# Patient Record
Sex: MALE | Race: WHITE | NOT HISPANIC OR LATINO | URBAN - METROPOLITAN AREA
[De-identification: names, ages, dates, MRNs, and addresses within clinical notes are randomized per-mention and may not be internally consistent; named-entity substitution may affect disease eponyms.]

---

## 2018-03-28 ENCOUNTER — INPATIENT (INPATIENT)
Facility: HOSPITAL | Age: 78
LOS: 1 days | Discharge: HOME | DRG: 378 | End: 2018-03-30
Attending: STUDENT IN AN ORGANIZED HEALTH CARE EDUCATION/TRAINING PROGRAM | Admitting: STUDENT IN AN ORGANIZED HEALTH CARE EDUCATION/TRAINING PROGRAM
Payer: MEDICARE

## 2018-03-28 VITALS
RESPIRATION RATE: 19 BRPM | SYSTOLIC BLOOD PRESSURE: 145 MMHG | HEART RATE: 82 BPM | OXYGEN SATURATION: 100 % | TEMPERATURE: 99 F | DIASTOLIC BLOOD PRESSURE: 78 MMHG

## 2018-03-28 LAB
ALBUMIN SERPL ELPH-MCNC: 3.2 G/DL — LOW (ref 3.4–5)
ALP SERPL-CCNC: 82 U/L — SIGNIFICANT CHANGE UP (ref 40–120)
ALT FLD-CCNC: 191 U/L — HIGH (ref 12–42)
ANION GAP SERPL CALC-SCNC: 11 MMOL/L — SIGNIFICANT CHANGE UP (ref 9–16)
APTT BLD: 73.4 SEC — HIGH (ref 27.5–36.5)
AST SERPL-CCNC: 84 U/L — HIGH (ref 15–37)
BASOPHILS NFR BLD AUTO: 0.1 % — SIGNIFICANT CHANGE UP (ref 0–2)
BILIRUB SERPL-MCNC: 0.5 MG/DL — SIGNIFICANT CHANGE UP (ref 0.2–1.2)
BUN SERPL-MCNC: 53 MG/DL — HIGH (ref 7–23)
CALCIUM SERPL-MCNC: 8.6 MG/DL — SIGNIFICANT CHANGE UP (ref 8.5–10.5)
CHLORIDE SERPL-SCNC: 102 MMOL/L — SIGNIFICANT CHANGE UP (ref 96–108)
CO2 SERPL-SCNC: 25 MMOL/L — SIGNIFICANT CHANGE UP (ref 22–31)
CREAT SERPL-MCNC: 1.48 MG/DL — HIGH (ref 0.5–1.3)
CRP SERPL-MCNC: <0.9 MG/DL — SIGNIFICANT CHANGE UP (ref 0–0.9)
EOSINOPHIL NFR BLD AUTO: 0.1 % — SIGNIFICANT CHANGE UP (ref 0–6)
GLUCOSE SERPL-MCNC: 141 MG/DL — HIGH (ref 70–99)
HCT VFR BLD CALC: 30.5 % — LOW (ref 39–50)
HGB BLD-MCNC: 9.8 G/DL — LOW (ref 13–17)
IMM GRANULOCYTES NFR BLD AUTO: 0.5 % — SIGNIFICANT CHANGE UP (ref 0–1.5)
INR BLD: 13.75 — CRITICAL HIGH (ref 0.88–1.16)
LACTATE SERPL-SCNC: 3.8 MMOL/L — HIGH (ref 0.4–2)
LYMPHOCYTES # BLD AUTO: 6.3 % — LOW (ref 13–44)
MAGNESIUM SERPL-MCNC: 2 MG/DL — SIGNIFICANT CHANGE UP (ref 1.6–2.6)
MCHC RBC-ENTMCNC: 27.1 PG — SIGNIFICANT CHANGE UP (ref 27–34)
MCHC RBC-ENTMCNC: 32.1 G/DL — SIGNIFICANT CHANGE UP (ref 32–36)
MCV RBC AUTO: 84.5 FL — SIGNIFICANT CHANGE UP (ref 80–100)
MONOCYTES NFR BLD AUTO: 8 % — SIGNIFICANT CHANGE UP (ref 2–14)
NEUTROPHILS NFR BLD AUTO: 85 % — HIGH (ref 43–77)
NT-PROBNP SERPL-SCNC: 2747 PG/ML — HIGH
OB PNL STL: POSITIVE
PLATELET # BLD AUTO: 196 K/UL — SIGNIFICANT CHANGE UP (ref 150–400)
POTASSIUM SERPL-MCNC: 3.9 MMOL/L — SIGNIFICANT CHANGE UP (ref 3.5–5.3)
POTASSIUM SERPL-SCNC: 3.9 MMOL/L — SIGNIFICANT CHANGE UP (ref 3.5–5.3)
PROT SERPL-MCNC: 5.9 G/DL — LOW (ref 6.4–8.2)
PROTHROM AB SERPL-ACNC: 159.6 SEC — HIGH (ref 9.8–12.7)
RBC # BLD: 3.61 M/UL — LOW (ref 4.2–5.8)
RBC # FLD: 19.9 % — HIGH (ref 10.3–16.9)
SODIUM SERPL-SCNC: 138 MMOL/L — SIGNIFICANT CHANGE UP (ref 132–145)
TROPONIN I SERPL-MCNC: 0.02 NG/ML — SIGNIFICANT CHANGE UP (ref 0.02–0.06)
WBC # BLD: 15.9 K/UL — HIGH (ref 3.8–10.5)
WBC # FLD AUTO: 15.9 K/UL — HIGH (ref 3.8–10.5)

## 2018-03-28 PROCEDURE — 71045 X-RAY EXAM CHEST 1 VIEW: CPT | Mod: 26

## 2018-03-28 PROCEDURE — 93010 ELECTROCARDIOGRAM REPORT: CPT

## 2018-03-28 PROCEDURE — 99223 1ST HOSP IP/OBS HIGH 75: CPT | Mod: GC

## 2018-03-28 PROCEDURE — 99291 CRITICAL CARE FIRST HOUR: CPT

## 2018-03-28 RX ORDER — SODIUM CHLORIDE 9 MG/ML
1000 INJECTION INTRAMUSCULAR; INTRAVENOUS; SUBCUTANEOUS ONCE
Qty: 0 | Refills: 0 | Status: COMPLETED | OUTPATIENT
Start: 2018-03-28 | End: 2018-03-28

## 2018-03-28 RX ORDER — PANTOPRAZOLE SODIUM 20 MG/1
8 TABLET, DELAYED RELEASE ORAL
Qty: 80 | Refills: 0 | Status: DISCONTINUED | OUTPATIENT
Start: 2018-03-28 | End: 2018-03-29

## 2018-03-28 RX ORDER — PROTHROMBIN COMPLEX CONCENTRATE (HUMAN) 25.5; 16.5; 24; 22; 22; 26 [IU]/ML; [IU]/ML; [IU]/ML; [IU]/ML; [IU]/ML; [IU]/ML
1500 POWDER, FOR SOLUTION INTRAVENOUS ONCE
Qty: 0 | Refills: 0 | Status: COMPLETED | OUTPATIENT
Start: 2018-03-28 | End: 2018-03-28

## 2018-03-28 RX ORDER — PHYTONADIONE (VIT K1) 5 MG
5 TABLET ORAL ONCE
Qty: 0 | Refills: 0 | Status: COMPLETED | OUTPATIENT
Start: 2018-03-28 | End: 2018-03-28

## 2018-03-28 RX ORDER — PANTOPRAZOLE SODIUM 20 MG/1
80 TABLET, DELAYED RELEASE ORAL ONCE
Qty: 0 | Refills: 0 | Status: COMPLETED | OUTPATIENT
Start: 2018-03-28 | End: 2018-03-28

## 2018-03-28 RX ADMIN — Medication 101 MILLIGRAM(S): at 23:22

## 2018-03-28 RX ADMIN — PANTOPRAZOLE SODIUM 80 MILLIGRAM(S): 20 TABLET, DELAYED RELEASE ORAL at 21:51

## 2018-03-28 RX ADMIN — Medication 5 MILLIGRAM(S): at 22:52

## 2018-03-28 RX ADMIN — SODIUM CHLORIDE 1000 MILLILITER(S): 9 INJECTION INTRAMUSCULAR; INTRAVENOUS; SUBCUTANEOUS at 21:30

## 2018-03-28 RX ADMIN — PANTOPRAZOLE SODIUM 10 MG/HR: 20 TABLET, DELAYED RELEASE ORAL at 21:55

## 2018-03-28 RX ADMIN — PROTHROMBIN COMPLEX CONCENTRATE (HUMAN) 400 INTERNATIONAL UNIT(S): 25.5; 16.5; 24; 22; 22; 26 POWDER, FOR SOLUTION INTRAVENOUS at 23:57

## 2018-03-28 RX ADMIN — SODIUM CHLORIDE 1000 MILLILITER(S): 9 INJECTION INTRAMUSCULAR; INTRAVENOUS; SUBCUTANEOUS at 22:32

## 2018-03-28 NOTE — ED PROVIDER NOTE - SKIN, MLM
Somewhat pale, some skin excorations (from prednisone), feet soles are dusky b/l with delayed cap refill.

## 2018-03-28 NOTE — ED PROVIDER NOTE - PROGRESS NOTE DETAILS
Spoke with Dr. Polo who agrees with Admission to [facility]. Patient accepted to MICU by Dr. Grossman (for DR. Cruz). Initially given Vit K 5 PO with plan for FFP. Dr. Ramirez preferred Vit K 5 IV additionally and KCentra given severely elevated INR. Patient remains HD stable and is agreeable to go to [facility]. Wife at bedside. Laccate 3.8. Protonix GGT started. Spoke with Dr. Polo who agrees with Admission to [facility]. Patient accepted to MICU by Dr. Grossman (for DR. Cruz). Initially given Vit K 5 PO with plan for FFP. Dr. Ramirez preferred Vit K 5 IV additionally and KCentra given severely elevated INR. Patient remains HD stable and is agreeable to go to [facility]. Wife at bedside. Laccate 3.8. Protonix GGT started.    Dr. Polo would like him to get at least 1 U pRBCs given his recent cardiac illness and sx.

## 2018-03-28 NOTE — ED PROVIDER NOTE - DIAGNOSTIC INTERPRETATION
Interpreted by ED Physician:  CXR (1 view): no acute abnormality: no infiltrates, bones appear intact, cardiac silhouette is enlarged

## 2018-03-28 NOTE — ED PROVIDER NOTE - PMH
Acute pericarditis, unspecified type    Chronic atrial fibrillation    Gout, unspecified cause, unspecified chronicity, unspecified site

## 2018-03-28 NOTE — ED PROVIDER NOTE - GASTROINTESTINAL, MLM
Abdomen soft, non-tender, no guarding. RECTAL: + dried small amt of blood at perianal area, no grods blood on PRINCE, no ext hemorrhoid, non tender PRINCE

## 2018-03-28 NOTE — ED PROVIDER NOTE - CRITICAL CARE PROVIDED
additional history taking/documentation/consult w/ pt's family directly relating to pts condition/direct patient care (not related to procedure)/interpretation of diagnostic studies/consultation with other physicians

## 2018-03-28 NOTE — ED PROVIDER NOTE - MEDICAL DECISION MAKING DETAILS
Patient presenting with sx most consistent with UGIB on Coumadin with elevated INR at 14.6 in [facility]. Will check labs with lactate and trop (given recent pericarditis and dusky feet). No other sighs of shock. HD stable. Anticipate admission.

## 2018-03-28 NOTE — ED PROVIDER NOTE - OBJECTIVE STATEMENT
77 M here with INR 14.6 and black tarry stools /diarrhea x 2 weeks and some scant bleeding from hemorrhoids with wiping today (no blood in blow, just on TP). Wife also noted some dried blood at nares after dinner tonight (but no nosebleed).  No chest pain. + SOBOE/fatigued. Vague mild abdo discomfort x 2 weeks. Skin at feet is cracked and dry in winter a per usual but has been bleeding a little this week. No other bleeding.      He had routine labs Monday and INR was 14.4. Sent to [facility] today and it was 14.6 there. Sent to ED. Cardiologist ii at [facility] and PMD is Dr. Curly Polo [phone] (from ).     He has a long hx of AF on Coumadin x 22yrs. Normally has INT 2-3 on stable dos of 4mg.   Also has past heavy EtOH use.    Over the past 6 mos has had more SOBOE/fatigue. Worse x 6 weeks. He was ultimately dx'd with an inflammatory pericarditis with small pleural and pericardial effusions and has been on Prednisone 40mg and Colchicine 0.6 for the past 2 weeks. He thought that the black stools and diarrhea were side effects of colchicine. Over the past few days has been more fatigued/wiped out. ESR was in 70's and CRP close to 200.     He and his wife say that his feet are normally purpale/dusky. Never had PVD eval or ewas told that he had delayed cap refill.

## 2018-03-28 NOTE — ED ADULT TRIAGE NOTE - CHIEF COMPLAINT QUOTE
Pt. sent from PMD for an INR of 14.4, pt. currently on coumadin. Pt. states he has bleeding hemorrhoids. Pt. presents pale on arrival.

## 2018-03-29 DIAGNOSIS — F32.9 MAJOR DEPRESSIVE DISORDER, SINGLE EPISODE, UNSPECIFIED: ICD-10-CM

## 2018-03-29 DIAGNOSIS — I30.9 ACUTE PERICARDITIS, UNSPECIFIED: ICD-10-CM

## 2018-03-29 DIAGNOSIS — I48.2 CHRONIC ATRIAL FIBRILLATION: ICD-10-CM

## 2018-03-29 DIAGNOSIS — K92.1 MELENA: ICD-10-CM

## 2018-03-29 DIAGNOSIS — N18.9 CHRONIC KIDNEY DISEASE, UNSPECIFIED: ICD-10-CM

## 2018-03-29 DIAGNOSIS — R79.1 ABNORMAL COAGULATION PROFILE: ICD-10-CM

## 2018-03-29 DIAGNOSIS — R74.0 NONSPECIFIC ELEVATION OF LEVELS OF TRANSAMINASE AND LACTIC ACID DEHYDROGENASE [LDH]: ICD-10-CM

## 2018-03-29 DIAGNOSIS — Z29.9 ENCOUNTER FOR PROPHYLACTIC MEASURES, UNSPECIFIED: ICD-10-CM

## 2018-03-29 DIAGNOSIS — R63.8 OTHER SYMPTOMS AND SIGNS CONCERNING FOOD AND FLUID INTAKE: ICD-10-CM

## 2018-03-29 DIAGNOSIS — D50.0 IRON DEFICIENCY ANEMIA SECONDARY TO BLOOD LOSS (CHRONIC): ICD-10-CM

## 2018-03-29 LAB
ALBUMIN SERPL ELPH-MCNC: 3.1 G/DL — LOW (ref 3.3–5)
ALP SERPL-CCNC: 58 U/L — SIGNIFICANT CHANGE UP (ref 40–120)
ALT FLD-CCNC: 134 U/L — HIGH (ref 10–45)
ANION GAP SERPL CALC-SCNC: 11 MMOL/L — SIGNIFICANT CHANGE UP (ref 5–17)
APPEARANCE UR: CLEAR — SIGNIFICANT CHANGE UP
APTT BLD: 34.7 SEC — SIGNIFICANT CHANGE UP (ref 27.5–37.4)
AST SERPL-CCNC: 72 U/L — HIGH (ref 10–40)
BILIRUB SERPL-MCNC: 0.4 MG/DL — SIGNIFICANT CHANGE UP (ref 0.2–1.2)
BILIRUB UR-MCNC: NEGATIVE — SIGNIFICANT CHANGE UP
BLD GP AB SCN SERPL QL: NEGATIVE — SIGNIFICANT CHANGE UP
BUN SERPL-MCNC: 41 MG/DL — HIGH (ref 7–23)
CALCIUM SERPL-MCNC: 7.7 MG/DL — LOW (ref 8.4–10.5)
CHLORIDE SERPL-SCNC: 101 MMOL/L — SIGNIFICANT CHANGE UP (ref 96–108)
CO2 SERPL-SCNC: 23 MMOL/L — SIGNIFICANT CHANGE UP (ref 22–31)
COLOR SPEC: YELLOW — SIGNIFICANT CHANGE UP
CREAT SERPL-MCNC: 1.07 MG/DL — SIGNIFICANT CHANGE UP (ref 0.5–1.3)
DIFF PNL FLD: NEGATIVE — SIGNIFICANT CHANGE UP
EOSINOPHIL NFR BLD AUTO: 0.1 % — SIGNIFICANT CHANGE UP (ref 0–6)
GLUCOSE SERPL-MCNC: 108 MG/DL — HIGH (ref 70–99)
GLUCOSE UR QL: NEGATIVE — SIGNIFICANT CHANGE UP
HCT VFR BLD CALC: 25 % — LOW (ref 39–50)
HCT VFR BLD CALC: 25.9 % — LOW (ref 39–50)
HCT VFR BLD CALC: 27.4 % — LOW (ref 39–50)
HCT VFR BLD CALC: 28.5 % — LOW (ref 39–50)
HGB BLD-MCNC: 7.9 G/DL — LOW (ref 13–17)
HGB BLD-MCNC: 8 G/DL — LOW (ref 13–17)
HGB BLD-MCNC: 8.6 G/DL — LOW (ref 13–17)
HGB BLD-MCNC: 9 G/DL — LOW (ref 13–17)
INR BLD: 1.76 — HIGH (ref 0.88–1.16)
INR BLD: 1.82 — HIGH (ref 0.88–1.16)
INR BLD: 1.84 — HIGH (ref 0.88–1.16)
KETONES UR-MCNC: NEGATIVE — SIGNIFICANT CHANGE UP
LACTATE SERPL-SCNC: 2.2 MMOL/L — HIGH (ref 0.5–2)
LACTATE SERPL-SCNC: 2.4 MMOL/L — HIGH (ref 0.4–2)
LEUKOCYTE ESTERASE UR-ACNC: NEGATIVE — SIGNIFICANT CHANGE UP
LIDOCAIN IGE QN: 95 U/L — HIGH (ref 7–60)
LYMPHOCYTES # BLD AUTO: 2.9 % — LOW (ref 13–44)
MCHC RBC-ENTMCNC: 26.1 PG — LOW (ref 27–34)
MCHC RBC-ENTMCNC: 26.3 PG — LOW (ref 27–34)
MCHC RBC-ENTMCNC: 26.7 PG — LOW (ref 27–34)
MCHC RBC-ENTMCNC: 26.7 PG — LOW (ref 27–34)
MCHC RBC-ENTMCNC: 30.9 G/DL — LOW (ref 32–36)
MCHC RBC-ENTMCNC: 31.4 G/DL — LOW (ref 32–36)
MCHC RBC-ENTMCNC: 31.6 G/DL — LOW (ref 32–36)
MCHC RBC-ENTMCNC: 31.6 G/DL — LOW (ref 32–36)
MCV RBC AUTO: 83.8 FL — SIGNIFICANT CHANGE UP (ref 80–100)
MCV RBC AUTO: 84.4 FL — SIGNIFICANT CHANGE UP (ref 80–100)
MCV RBC AUTO: 84.5 FL — SIGNIFICANT CHANGE UP (ref 80–100)
MCV RBC AUTO: 84.6 FL — SIGNIFICANT CHANGE UP (ref 80–100)
MONOCYTES NFR BLD AUTO: 4.7 % — SIGNIFICANT CHANGE UP (ref 2–14)
NEUTROPHILS NFR BLD AUTO: 92.3 % — HIGH (ref 43–77)
NITRITE UR-MCNC: NEGATIVE — SIGNIFICANT CHANGE UP
PH UR: 5.5 — SIGNIFICANT CHANGE UP (ref 5–8)
PLATELET # BLD AUTO: 130 K/UL — LOW (ref 150–400)
PLATELET # BLD AUTO: 143 K/UL — LOW (ref 150–400)
PLATELET # BLD AUTO: 144 K/UL — LOW (ref 150–400)
PLATELET # BLD AUTO: 145 K/UL — LOW (ref 150–400)
POTASSIUM SERPL-MCNC: 4.2 MMOL/L — SIGNIFICANT CHANGE UP (ref 3.5–5.3)
POTASSIUM SERPL-SCNC: 4.2 MMOL/L — SIGNIFICANT CHANGE UP (ref 3.5–5.3)
PROCALCITONIN SERPL-MCNC: <0.05 NG/ML — SIGNIFICANT CHANGE UP (ref 0–0.04)
PROT SERPL-MCNC: 4.8 G/DL — LOW (ref 6–8.3)
PROT UR-MCNC: NEGATIVE MG/DL — SIGNIFICANT CHANGE UP
PROTHROM AB SERPL-ACNC: 19.6 SEC — HIGH (ref 9.8–12.7)
PROTHROM AB SERPL-ACNC: 20.5 SEC — HIGH (ref 9.8–12.7)
PROTHROM AB SERPL-ACNC: 20.7 SEC — HIGH (ref 9.8–12.7)
RBC # BLD: 2.96 M/UL — LOW (ref 4.2–5.8)
RBC # BLD: 3.07 M/UL — LOW (ref 4.2–5.8)
RBC # BLD: 3.27 M/UL — LOW (ref 4.2–5.8)
RBC # BLD: 3.37 M/UL — LOW (ref 4.2–5.8)
RBC # FLD: 19.8 % — HIGH (ref 10.3–16.9)
RBC # FLD: 20.1 % — HIGH (ref 10.3–16.9)
RBC # FLD: 20.1 % — HIGH (ref 10.3–16.9)
RBC # FLD: 20.3 % — HIGH (ref 10.3–16.9)
RH IG SCN BLD-IMP: POSITIVE — SIGNIFICANT CHANGE UP
RH IG SCN BLD-IMP: POSITIVE — SIGNIFICANT CHANGE UP
SODIUM SERPL-SCNC: 135 MMOL/L — SIGNIFICANT CHANGE UP (ref 135–145)
SP GR SPEC: 1.02 — SIGNIFICANT CHANGE UP (ref 1–1.03)
UROBILINOGEN FLD QL: 0.2 E.U./DL — SIGNIFICANT CHANGE UP
WBC # BLD: 11 K/UL — HIGH (ref 3.8–10.5)
WBC # BLD: 11.4 K/UL — HIGH (ref 3.8–10.5)
WBC # BLD: 14.4 K/UL — HIGH (ref 3.8–10.5)
WBC # BLD: 15 K/UL — HIGH (ref 3.8–10.5)
WBC # FLD AUTO: 11 K/UL — HIGH (ref 3.8–10.5)
WBC # FLD AUTO: 11.4 K/UL — HIGH (ref 3.8–10.5)
WBC # FLD AUTO: 14.4 K/UL — HIGH (ref 3.8–10.5)
WBC # FLD AUTO: 15 K/UL — HIGH (ref 3.8–10.5)

## 2018-03-29 PROCEDURE — 93010 ELECTROCARDIOGRAM REPORT: CPT

## 2018-03-29 PROCEDURE — 71045 X-RAY EXAM CHEST 1 VIEW: CPT | Mod: 26

## 2018-03-29 PROCEDURE — 99233 SBSQ HOSP IP/OBS HIGH 50: CPT | Mod: GC

## 2018-03-29 RX ORDER — DULOXETINE HYDROCHLORIDE 30 MG/1
1 CAPSULE, DELAYED RELEASE ORAL
Qty: 0 | Refills: 0 | COMMUNITY

## 2018-03-29 RX ORDER — PANTOPRAZOLE SODIUM 20 MG/1
8 TABLET, DELAYED RELEASE ORAL
Qty: 80 | Refills: 0 | Status: DISCONTINUED | OUTPATIENT
Start: 2018-03-29 | End: 2018-03-29

## 2018-03-29 RX ORDER — METOPROLOL TARTRATE 50 MG
1 TABLET ORAL
Qty: 0 | Refills: 0 | COMMUNITY

## 2018-03-29 RX ORDER — ALLOPURINOL 300 MG
100 TABLET ORAL THREE TIMES A DAY
Qty: 0 | Refills: 0 | Status: DISCONTINUED | OUTPATIENT
Start: 2018-03-29 | End: 2018-03-30

## 2018-03-29 RX ORDER — ALLOPURINOL 300 MG
1 TABLET ORAL
Qty: 0 | Refills: 0 | COMMUNITY

## 2018-03-29 RX ORDER — METOPROLOL TARTRATE 50 MG
50 TABLET ORAL DAILY
Qty: 0 | Refills: 0 | Status: DISCONTINUED | OUTPATIENT
Start: 2018-03-29 | End: 2018-03-30

## 2018-03-29 RX ORDER — DILTIAZEM HCL 120 MG
1 CAPSULE, EXT RELEASE 24 HR ORAL
Qty: 0 | Refills: 0 | COMMUNITY

## 2018-03-29 RX ORDER — PANTOPRAZOLE SODIUM 20 MG/1
40 TABLET, DELAYED RELEASE ORAL EVERY 12 HOURS
Qty: 0 | Refills: 0 | Status: DISCONTINUED | OUTPATIENT
Start: 2018-03-29 | End: 2018-03-30

## 2018-03-29 RX ORDER — DULOXETINE HYDROCHLORIDE 30 MG/1
60 CAPSULE, DELAYED RELEASE ORAL DAILY
Qty: 0 | Refills: 0 | Status: DISCONTINUED | OUTPATIENT
Start: 2018-03-29 | End: 2018-03-30

## 2018-03-29 RX ADMIN — Medication 20 MILLIGRAM(S): at 17:40

## 2018-03-29 RX ADMIN — PANTOPRAZOLE SODIUM 40 MILLIGRAM(S): 20 TABLET, DELAYED RELEASE ORAL at 17:40

## 2018-03-29 RX ADMIN — Medication 50 MILLIGRAM(S): at 16:19

## 2018-03-29 RX ADMIN — DULOXETINE HYDROCHLORIDE 60 MILLIGRAM(S): 30 CAPSULE, DELAYED RELEASE ORAL at 11:44

## 2018-03-29 RX ADMIN — Medication 100 MILLIGRAM(S): at 22:07

## 2018-03-29 RX ADMIN — Medication 10 MILLIGRAM(S): at 07:12

## 2018-03-29 RX ADMIN — Medication 100 MILLIGRAM(S): at 16:19

## 2018-03-29 NOTE — PROGRESS NOTE ADULT - PROBLEM SELECTOR PLAN 5
Home coumadin held. EKG showing AF w/ poor R-wave progression. No ischemic changes. Trop negative x1.   - Home diltiazem (240mg/24h) and metoprolol succinate ER 50mg.

## 2018-03-29 NOTE — PROGRESS NOTE ADULT - PROBLEM SELECTOR PLAN 3
Per outpatient records, patient has a baseline hemoglobin between 12-14 with platelets ~500. On admission, patient's hemoglobin was 9.8 and has ranged from 7.9 to 8.6 while in the MICU. Given supratherapeutic INR and history of melena, anemia likely 2/2 to acute blood loss. Currently no active signs of bleeding.   - Tx goal 8-9 if active bleeding, > 7 otherwise.   - Monitor for signs of acute bleeding.  - maintain platelets > 50k.   - Maintain active T&S.  - R AC and L forearm pIV.   - Patient hemodynamically stable.

## 2018-03-29 NOTE — PROGRESS NOTE ADULT - ASSESSMENT
77M pmhx AFib, hx of cutaneous marginal skin lymphoma newly diagnosed pericarditis with anemia and likely GIB 2/2 supratherapeutic INR in the setting of recent medication changes, prednisone/colchicine. Also with 12 pound weight loss, decreased appetite and night sweats over the past two months.     PLAN:     HEME/ONC  #Acute hemorrhagic anemia 2/2 GIB in the setting of a supratherapeutic INR after recent initiation of prednisone.   - s/p Vitamin K and KCentra with adequate reversal of coumadin. (INR < 2)  - Q2hr CBCs  - GI consulted. f/u recs   - Tx goal 8-9 if active bleeding, > 7 otherwise.   - Monitor for signs of acute bleeding.  - maintain platelets > 50k.   - Maintain active T&S.  - R AC and L forearm pIV.   - Patient hemodynamically stable.     #B-symptoms with fecal incontinence and weight loss concerning for malignancy.   - Pt with hxo f marginal skin lymphoma s/p radiation in 2014. Per rheumatologist, CT chest/abd/pelvic done this year at [facility] was negative.  - Will need further workup and imaging after acute issues are resolved.   - f/u GI recs     CARDIOVASCULAR  #AF on coumadin  - Home coumadin held.  - EKG showing AF w/ poor R-wave progression. No ischemic changes.  - Trop negative x1.   - Home diltiazem/metoprolol held.    #idiopathic pericarditis diagnosed 3 weeks ago at [facility] (Cardiologist Dr. Hogue)  - Has been taking 20mg prednisone BID for 3 weeks.   - Steroids tapered to 10mg BID overnight given concern for interactiion with Warfarin however opr rheumatologist suggests maintaining on home 20 BId till his appointment next week.  - Home colchicine held.   - Per opt rheum, Pt has high but stable coxsackie antibodies.    PULMONARY  - Maintain O2 sat > 94%.     RENAL  -CAROL on admission   Cr 1.48 improved to 1.07 after IVF.   Lactate improved after IVF.     NEURO  - Monitor for signs of pain.     GI/FEN - IVF as needed; replete lytes prn; NPO  - PPI drip.     PPx - PPI SCDs    LINES - R AC and L forearm 20ga pIV.     CODE - FULL.    DISPO - MICU to [facility]

## 2018-03-29 NOTE — CONSULT NOTE ADULT - ASSESSMENT
77yr old M with PMHx significant for AFib (on AC with coumadin), Hemorrhoids (with intermittent BRBPR on wiping and sometimes coating stool), Gout, Depression who was admitted from [facility] for evaluation of melena, SOB, and fatigue.   GI was consulted for evaluation of melena.     # GI Bleed -   - in setting of supratherapeutic INR  - ddx - PUD, gastritis, diverticular bleed, AVMs, hemorrhoidal bleed  - Hgb stable  - trend Hgb  - no further active bleeding  - rectal negative for melena  - INR now corrected  - will hold off on endoscopic interventions for now      Discussed with attending Dr Everardo LOPEZ following

## 2018-03-29 NOTE — H&P ADULT - NSHPLABSRESULTS_GEN_ALL_CORE
.  LABS:                         8.0    14.4  )-----------( 145      ( 29 Mar 2018 02:34 )             25.9         135  |  101  |  41<H>  ----------------------------<  108<H>  4.2   |  23  |  1.07    Ca    7.7<L>      29 Mar 2018 02:33  Mg     2.0         TPro  4.8<L>  /  Alb  3.1<L>  /  TBili  0.4  /  DBili  x   /  AST  72<H>  /  ALT  134<H>  /  AlkPhos  58      PT/INR - ( 29 Mar 2018 02:34 )   PT: 20.5 sec;   INR: 1.82          PTT - ( 29 Mar 2018 02:34 )  PTT:34.7 sec  Urinalysis Basic - ( 29 Mar 2018 00:17 )    Color: Yellow / Appearance: Clear / S.020 / pH: x  Gluc: x / Ketone: NEGATIVE  / Bili: NEGATIVE / Urobili: 0.2 E.U./dL   Blood: x / Protein: NEGATIVE mg/dL / Nitrite: NEGATIVE   Leuk Esterase: NEGATIVE / RBC: x / WBC x   Sq Epi: x / Non Sq Epi: x / Bacteria: x      CARDIAC MARKERS ( 28 Mar 2018 21:20 )  0.018 ng/mL / x     / x     / x     / x          Serum Pro-Brain Natriuretic Peptide: 2747 pg/mL ( @ 22:31)    Lactate, Blood: 2.2 mmoL/L ( @ 02:34)  Lactate, Blood: 2.4 mmoL/L ( @ 23:42)  Lactate, Blood: 3.8 mmoL/L ( @ 21:34)      RADIOLOGY, EKG & ADDITIONAL TESTS: Reviewed.     EKG - AFib w/ poor R-wave progression. No ischemic changes (TWI, ST-elevations)

## 2018-03-29 NOTE — CONSULT NOTE ADULT - SUBJECTIVE AND OBJECTIVE BOX
HPI:  77yr old M with PMHx significant for AFib (on AC with coumadin), Hemorrhoids (with intermittent BRBPR on wiping and sometimes coating stool), Gout, Depression who was admitted from [facility] for evaluation of melena, SOB, and fatigue.   GI was consulted for evaluation of melena.   Patient reports that he has been on coumadin for a while but was recently started on prednisone and colchicine for management of idiopathic pericarditis a few weeks ago. He feels that after starting the colchicine he started noting dark tarry/formed stools since last 2 weeks and has happened 4-5x. He reports that Dr Hogue who started him on Rx told him it is a potential side effect of the colchicine. Patient denies n/v/d, abdominal pain, distention, bleeding (hematochezia, coffee grounds, hematuria), fever, chills, sick contacts, recent travel. He had his INR checked and it was supraRx twice so he presented to the ED for further evaluation.      EGD/EUS - 2015 - sp gastric nodule removal, 2 simple cysts in the pancreas  Colonoscopy - 2yrs ago - small polyp removed, diverticulosis      PAST MEDICAL & SURGICAL HISTORY:  Acute pericarditis, unspecified type  Gout, unspecified cause, unspecified chronicity, unspecified site  Chronic atrial fibrillation  No significant past surgical history      REVIEW OF SYSTEMS  Apart from items noted in the HPI a 10point ROS was negative      MEDICATIONS  (STANDING):  DULoxetine 60 milliGRAM(s) Oral daily  pantoprazole Infusion 8 mG/Hr (10 mL/Hr) IV Continuous <Continuous>  predniSONE   Tablet 10 milliGRAM(s) Oral two times a day    MEDICATIONS  (PRN):      Allergies  penicillin (Rash)  sulfa drugs (Rash)    Intolerances      SOCIAL HISTORY:  Denies toxic or illicit habits    FAMILY HISTORY:  Family history of dementia (Mother)  Family history of hypertension (Mother)  Family history of lung cancer (Father, Sibling)      Vital Signs Last 24 Hrs  T(C): 36.4 (29 Mar 2018 09:30), Max: 37 (28 Mar 2018 20:56)  T(F): 97.6 (29 Mar 2018 09:30), Max: 98.6 (28 Mar 2018 20:56)  HR: 86 (29 Mar 2018 11:00) (60 - 86)  BP: 146/78 (29 Mar 2018 11:00) (109/56 - 146/78)  BP(mean): 100 (29 Mar 2018 11:00) (82 - 103)  RR: 24 (29 Mar 2018 11:00) (11 - 24)  SpO2: 99% (29 Mar 2018 11:00) (98% - 100%)    PHYSICAL EXAM:  GEN - elderly M lying in bed in no distress, anicteric, afebrile, not pale  Respiratory: CTA  Cardiovascular: s1 s2 no M irregular  Gastrointestinal: full, soft, BS+, NT, NR, NG, no masses or organs palpated  Rectal: no melena on examining finger  Extremities: no edema  Neurological: AAOx3 non focal  Skin: intact      LABS:                     8.6    11.0  )-----------( 130      ( 29 Mar 2018 09:51 )             27.4     135  |  101  |  41<H>  ----------------------------<  108<H>  4.2   |  23  |  1.07    Ca    7.7<L>      29 Mar 2018 02:33  Mg     2.0     03-28    TPro  4.8<L>  /  Alb  3.1<L>  /  TBili  0.4  /  DBili  x   /  AST  72<H>  /  ALT  134<H>  /  AlkPhos  58  03-29    PT/INR - ( 29 Mar 2018 04:35 )   PT: 20.7 sec;   INR: 1.84        PTT - ( 29 Mar 2018 02:34 )  PTT:34.7 sec    Urinalysis Basic - ( 29 Mar 2018 00:17 )  Color: Yellow / Appearance: Clear / S.020 / pH: x  Gluc: x / Ketone: NEGATIVE  / Bili: NEGATIVE / Urobili: 0.2 E.U./dL   Blood: x / Protein: NEGATIVE mg/dL / Nitrite: NEGATIVE   Leuk Esterase: NEGATIVE / RBC: x / WBC x   Sq Epi: x / Non Sq Epi: x / Bacteria: x          RADIOLOGY & ADDITIONAL STUDIES:

## 2018-03-29 NOTE — PROGRESS NOTE ADULT - PROBLEM SELECTOR PLAN 8
Per labs on 3/13, patient was found to have AST of 71 and ALT of 102. Admission labs significant for AST/ALT of 84/191  -Avoid hepatotoxic agents  -Outpatient monitoring  -F/u AM INR

## 2018-03-29 NOTE — PROGRESS NOTE ADULT - PROBLEM SELECTOR PLAN 4
Idiopathic pericarditis diagnosed 3 weeks ago with Dr. Blanc at [facility]. Has been taking 20mg prednisone BID for 3 weeks. Outpatient labs on 3/13 s/f ESR of 56 and CRP of 47.   - Steroids tapered to 10mg BID overnight given concern for interaction with Warfarin however outpatient rheumatologist suggests maintaining on home 20mg BID till his appointment next week.  - Home colchicine held.   - Per opt rheum, Pt has high but stable coxsackie antibodies.

## 2018-03-29 NOTE — PROGRESS NOTE ADULT - SUBJECTIVE AND OBJECTIVE BOX
Transfer Note: MICU to [facility]    Hospital Course:   77M pmhx AFib (coumadin), hemorrhoids, gout, depression who presented to [facility] with 4-5 days of fatigue, shortness of breath and dark stools. He was in his USOH until early November of last year when he had similar symptoms, 4-5 days of fatigue and decreased exercise tolerance with associated chest pain and shortness of breath. The symptoms resolved on their own but recurred every 5-6 weeks. He was evaluated by a Dr. Blanc at [place] and was diagnosed with idiopathic pericarditis and started on Colchicine and Prednisone 3 weeks ago. A few days ago he became short of breath again and noted some episodes of diarrhea with dark stools and some bright red blood on the toilet paper. The BRBPR was not new, but the dark stools were. The last episode was early yesterday (3/28). He had his INR checked on Monday and it was noted to be 14.4. Two weeks prior his INR was ~1.9-2.2. The patient had been on a stable dose of coumadin 4mg for years and so his doctor had it repeated on 3/28, and it was again >14 and he presented to [facility] for evaluation. On arrival to [facility] patient was hemodynamically stable but labs were significant for INR of 13.75, Hg 9.8 (12.8 on 3/13) FOBT+. He was given 2L NS, started on a PPI drip, 10mg Vit K and KCENTRA and then transferred to [facility] for further care. AM INR was found to be 1.84 and Hgb was 8.6. GI was consulted, awaiting recommendations. Patient has had no further episodes of melena and is hemodynamically stable for transfer to the [facility].     Subjective/ROS: Patient reports occasional dizziness and chest tightness when changing positions over the past few days. He has had no bowel movements since being in the hospital and denies any signs of active bleeding. Denies headache, dizziness, lightheadedness, chest pain, shortness of breath, palpitations, abdominal pain, nausea, vomiting, hematemesis, diarrhea, hematochezia, melena, fevers, and chills.     VITAL SIGNS:  Vital Signs Last 24 Hrs  T(C): 36.4 (29 Mar 2018 09:30), Max: 37 (28 Mar 2018 20:56)  T(F): 97.6 (29 Mar 2018 09:30), Max: 98.6 (28 Mar 2018 20:56)  HR: 86 (29 Mar 2018 11:00) (60 - 86)  BP: 146/78 (29 Mar 2018 11:00) (109/56 - 146/78)  BP(mean): 100 (29 Mar 2018 11:00) (82 - 103)  RR: 24 (29 Mar 2018 11:00) (11 - 24)  SpO2: 99% (29 Mar 2018 11:00) (98% - 100%)    PHYSICAL EXAM:    General: Well nourished, well-groomed elderly man lying comfortably in bed; NAD  HEENT: NC/AT; PERRLA, EOMI, anicteric sclera; MMM, conjunctival pallor  Neck: supple, no JVD  Cardiovascular: +S1/S2; irregular rhythm, regular rate, no r/m/g  Respiratory: L basilar crackles, no rhonchi, no wheezes, no use of accessory muscles  Gastrointestinal: soft, NT/ND; +BS  Extremities: WWP; no edema, clubbing or cyanosis  Vascular: 2+ radial, DP pulses B/L  Neurological: AAOx3; no focal deficits  Skin: Petechiae on arms, back, and legs    MEDICATIONS:  MEDICATIONS  (STANDING):  DULoxetine 60 milliGRAM(s) Oral daily  pantoprazole Infusion 8 mG/Hr (10 mL/Hr) IV Continuous <Continuous>  predniSONE   Tablet 10 milliGRAM(s) Oral two times a day    MEDICATIONS  (PRN):      ALLERGIES:  Allergies    penicillin (Rash)  sulfa drugs (Rash)    Intolerances        LABS:                        8.6    11.0  )-----------( 130      ( 29 Mar 2018 09:51 )             27.4     03-    135  |  101  |  41<H>  ----------------------------<  108<H>  4.2   |  23  |  1.07    Ca    7.7<L>      29 Mar 2018 02:33  Mg     2.0     03-28    TPro  4.8<L>  /  Alb  3.1<L>  /  TBili  0.4  /  DBili  x   /  AST  72<H>  /  ALT  134<H>  /  AlkPhos  58  03-29    PT/INR - ( 29 Mar 2018 04:35 )   PT: 20.7 sec;   INR: 1.84          PTT - ( 29 Mar 2018 02:34 )  PTT:34.7 sec  Urinalysis Basic - ( 29 Mar 2018 00:17 )    Color: Yellow / Appearance: Clear / S.020 / pH: x  Gluc: x / Ketone: NEGATIVE  / Bili: NEGATIVE / Urobili: 0.2 E.U./dL   Blood: x / Protein: NEGATIVE mg/dL / Nitrite: NEGATIVE   Leuk Esterase: NEGATIVE / RBC: x / WBC x   Sq Epi: x / Non Sq Epi: x / Bacteria: x      CAPILLARY BLOOD GLUCOSE          RADIOLOGY & ADDITIONAL TESTS: Reviewed. Transfer Note: MICU to [facility]    Hospital Course:   77M pmhx AFib (coumadin), hemorrhoids, gout, depression who presented to [facility] with 4-5 days of fatigue, shortness of breath and dark stools. He was in his USOH until early November of last year when he had similar symptoms, 4-5 days of fatigue and decreased exercise tolerance with associated chest pain and shortness of breath. The symptoms resolved on their own but recurred every 5-6 weeks. He was evaluated by a Dr. Blanc at [place] and was diagnosed with idiopathic pericarditis and started on Colchicine and Prednisone 3 weeks ago. A few days ago he became short of breath again and noted some episodes of diarrhea with dark stools and some bright red blood on the toilet paper. The BRBPR was not new, but the dark stools were. The last episode was early yesterday (3/28). He had his INR checked on Monday and it was noted to be 14.4. Two weeks prior his INR was ~1.9-2.2. The patient had been on a stable dose of coumadin 4mg for years and so his doctor had it repeated on 3/28, and it was again >14 and he presented to [facility] for evaluation. On arrival to [facility] patient was hemodynamically stable but labs were significant for INR of 13.75, Hg 9.8 (12.8 on 3/13) FOBT+. He was given 2L NS, started on a PPI drip, 10mg Vit K and KCENTRA and then transferred to [facility] for further care. AM INR was found to be 1.84 and Hgb was 8.6. GI was consulted, recommending no acute intervention. Patient's opt rheumatologist recommended c/w prednisone to home 20mg BID and holding colchicine given recent hx of pericarditis. Patient has had no further episodes of melena and is hemodynamically stable for transfer to the [facility].     Subjective/ROS: Patient reports occasional dizziness and chest tightness when changing positions over the past few days. He has had no bowel movements since being in the hospital and denies any signs of active bleeding. Denies headache, dizziness, lightheadedness, chest pain, shortness of breath, palpitations, abdominal pain, nausea, vomiting, hematemesis, diarrhea, hematochezia, melena, fevers, and chills.     VITAL SIGNS:  Vital Signs Last 24 Hrs  T(C): 36.4 (29 Mar 2018 09:30), Max: 37 (28 Mar 2018 20:56)  T(F): 97.6 (29 Mar 2018 09:30), Max: 98.6 (28 Mar 2018 20:56)  HR: 86 (29 Mar 2018 11:00) (60 - 86)  BP: 146/78 (29 Mar 2018 11:00) (109/56 - 146/78)  BP(mean): 100 (29 Mar 2018 11:00) (82 - 103)  RR: 24 (29 Mar 2018 11:00) (11 - 24)  SpO2: 99% (29 Mar 2018 11:00) (98% - 100%)    PHYSICAL EXAM:    General: Well nourished, well-groomed elderly man lying comfortably in bed; NAD  HEENT: NC/AT; PERRLA, EOMI, anicteric sclera; MMM, conjunctival pallor  Neck: supple, no JVD  Cardiovascular: +S1/S2; irregular rhythm, regular rate, no r/m/g  Respiratory: L basilar crackles, no rhonchi, no wheezes, no use of accessory muscles  Gastrointestinal: soft, NT/ND; +BS  Extremities: WWP; no edema, clubbing or cyanosis  Vascular: 2+ radial, DP pulses B/L  Neurological: AAOx3; no focal deficits  Skin: Petechiae on arms, back, and legs    MEDICATIONS:  MEDICATIONS  (STANDING):  DULoxetine 60 milliGRAM(s) Oral daily  pantoprazole Infusion 8 mG/Hr (10 mL/Hr) IV Continuous <Continuous>  predniSONE   Tablet 10 milliGRAM(s) Oral two times a day    MEDICATIONS  (PRN):      ALLERGIES:  Allergies    penicillin (Rash)  sulfa drugs (Rash)    Intolerances        LABS:                        8.6    11.0  )-----------( 130      ( 29 Mar 2018 09:51 )             27.4     03-    135  |  101  |  41<H>  ----------------------------<  108<H>  4.2   |  23  |  1.07    Ca    7.7<L>      29 Mar 2018 02:33  Mg     2.0     03-    TPro  4.8<L>  /  Alb  3.1<L>  /  TBili  0.4  /  DBili  x   /  AST  72<H>  /  ALT  134<H>  /  AlkPhos  58  03-29    PT/INR - ( 29 Mar 2018 04:35 )   PT: 20.7 sec;   INR: 1.84          PTT - ( 29 Mar 2018 02:34 )  PTT:34.7 sec  Urinalysis Basic - ( 29 Mar 2018 00:17 )    Color: Yellow / Appearance: Clear / S.020 / pH: x  Gluc: x / Ketone: NEGATIVE  / Bili: NEGATIVE / Urobili: 0.2 E.U./dL   Blood: x / Protein: NEGATIVE mg/dL / Nitrite: NEGATIVE   Leuk Esterase: NEGATIVE / RBC: x / WBC x   Sq Epi: x / Non Sq Epi: x / Bacteria: x      CAPILLARY BLOOD GLUCOSE          RADIOLOGY & ADDITIONAL TESTS: Reviewed.

## 2018-03-29 NOTE — PROGRESS NOTE ADULT - SUBJECTIVE AND OBJECTIVE BOX
77M pmhx AFib (coumadin), idiopathic perdicarditis (in prednisone and Colchicine), Cutaneous marginal lymphoma s/p radiation,  hemorrhoids, gout, depression who presented to [facility] with 4-5 days of fatigue, shortness of breath and dark stools in the setting of elevated INR since Monday to 14.4. On arrival to [facility], he had INR 13.75, Hg 9.8 FOBT+. He was given 2L NS, started on a PPI drip, 10mg Vit K and KCENTRA and then transferred to [facility] for further care. In MICU, his repeat INR was 1.76 and his Hb has been stable around .9-8.6. Gi was consulted. Overnight, patient's home colchicine was held and prednisone was tapered down to 10 bid however this AM patient's opt rheumatologist recommends increasing Prednisone to home 20bid and holding colchicine given recent hx of pericarditis. Pt is hemodynamically stable and Hb has been stable with no active bleeding. Stable for stepdown to RMF       INTERVAL HPI/OVERNIGHT EVENTS:    SUBJECTIVE: Patient seen and examined at bedside.    OBJECTIVE:    VITAL SIGNS:  ICU Vital Signs Last 24 Hrs  T(C): 36.4 (29 Mar 2018 09:30), Max: 37 (28 Mar 2018 20:56)  T(F): 97.6 (29 Mar 2018 09:30), Max: 98.6 (28 Mar 2018 20:56)  HR: 86 (29 Mar 2018 11:00) (60 - 86)  BP: 146/78 (29 Mar 2018 11:00) (109/56 - 146/78)  BP(mean): 100 (29 Mar 2018 11:00) (82 - 103)  ABP: --  ABP(mean): --  RR: 24 (29 Mar 2018 11:00) (11 - 24)  SpO2: 99% (29 Mar 2018 11:00) (98% - 100%)         @ 07:  -   @ 07:00  --------------------------------------------------------  IN: 60 mL / OUT: 1100 mL / NET: -1040 mL     @ 07: @ 12:13  --------------------------------------------------------  IN: 30 mL / OUT: 400 mL / NET: -370 mL      CAPILLARY BLOOD GLUCOSE          PHYSICAL EXAM:    General: NAD  HEENT: NC/AT; PERRL, clear conjunctiva  Neck: supple  Respiratory: CTA b/l  Cardiovascular: +S1/S2; RRR  Abdomen: soft, NT/ND; +BS x4  Extremities:  diffuse petechiae on forearms and legs   Vascular: WWP, 2+ peripheral pulses b/l;  Skin: normal color and turgor; no rash  Neurological: A&Ox3, move all extremities. CN II-XII intact    MEDICATIONS:  MEDICATIONS  (STANDING):  DULoxetine 60 milliGRAM(s) Oral daily  pantoprazole Infusion 8 mG/Hr (10 mL/Hr) IV Continuous <Continuous>  predniSONE   Tablet 10 milliGRAM(s) Oral two times a day    MEDICATIONS  (PRN):      ALLERGIES:  Allergies    penicillin (Rash)  sulfa drugs (Rash)    Intolerances        LABS:                        8.6    11.0  )-----------( 130      ( 29 Mar 2018 09:51 )             27.4         135  |  101  |  41<H>  ----------------------------<  108<H>  4.2   |  23  |  1.07    Ca    7.7<L>      29 Mar 2018 02:33  Mg     2.0         TPro  4.8<L>  /  Alb  3.1<L>  /  TBili  0.4  /  DBili  x   /  AST  72<H>  /  ALT  134<H>  /  AlkPhos  58      PT/INR - ( 29 Mar 2018 04:35 )   PT: 20.7 sec;   INR: 1.84          PTT - ( 29 Mar 2018 02:34 )  PTT:34.7 sec  Urinalysis Basic - ( 29 Mar 2018 00:17 )    Color: Yellow / Appearance: Clear / S.020 / pH: x  Gluc: x / Ketone: NEGATIVE  / Bili: NEGATIVE / Urobili: 0.2 E.U./dL   Blood: x / Protein: NEGATIVE mg/dL / Nitrite: NEGATIVE   Leuk Esterase: NEGATIVE / RBC: x / WBC x   Sq Epi: x / Non Sq Epi: x / Bacteria: x        RADIOLOGY & ADDITIONAL TESTS: Reviewed.

## 2018-03-29 NOTE — PROGRESS NOTE ADULT - PROBLEM SELECTOR PLAN 1
Acute hemorrhagic anemia 2/2 GIB in the setting of a supratherapeutic INR after recent initiation of prednisone.   - s/p Vitamin K and KCentra with adequate reversal of coumadin. (INR < 2)  - Q12hr CBCs  - GI consulted; appreciate recs

## 2018-03-29 NOTE — H&P ADULT - FAMILY HISTORY
Father  Still living? Unknown  Family history of lung cancer, Age at diagnosis: Age Unknown     Sibling  Still living? Unknown  Family history of lung cancer, Age at diagnosis: Age Unknown     Mother  Still living? Unknown  Family history of hypertension, Age at diagnosis: Age Unknown  Family history of dementia, Age at diagnosis: Age Unknown

## 2018-03-29 NOTE — PROGRESS NOTE ADULT - ASSESSMENT
77M pmhx AFib, newly diagnosed pericarditis with anemia and likely GIB 2/2 supratherapeutic INR in the setting of recent medication changes, prednisone/colchicine. Also with 12 pound weight loss, decreased appetite and night sweats over the past two months. 77M pmhx AFib, cutaneous B-cell lymphoma s/p radiation therapy, newly diagnosed pericarditis with anemia and likely GIB 2/2 supratherapeutic INR in the setting of recent medication changes, prednisone/colchicine. Also with 12 pound weight loss, decreased appetite and night sweats over the past two months.

## 2018-03-29 NOTE — PROGRESS NOTE ADULT - ATTENDING COMMENTS
dx  upper GI bleed - resolved. hb stable. would still recommend EGD . will need to d/w GI  transaminitis - etiology unk. LFTs improved. check hepatitis panel. check RUQ u/s  supratherapeutic INR - pt states he takes only warfarin 4mg. has not changed dose . recent addition of colchicine does not appear to interact w/ warfarin. s/p vit k and kcentra   afib- chadsvasc - 2 (age); can restart metoprolol. add dilt if BPs stable. off warfarin  depression- no SI/HI  idopathic pericarditis- cont prednisone.

## 2018-03-29 NOTE — H&P ADULT - HISTORY OF PRESENT ILLNESS
77M pmhx AFib (coumadin), gout, depression who presented to [facility] with 4-5 days of fatigue, shortness of breath and dark stools. He was in his USOH until early November of last year when he had similar symptoms, 4-5 days of fatigue with associated chest pain and shortness of breath. The symptoms resolved on their own but recurred every 5-6 weeks. He was evaluated by a Dr. Blanc at [place] and was diagnosed 77M pmhx AFib (coumadin), hemorrhoids, gout, depression who presented to [facility] with 4-5 days of fatigue, shortness of breath and dark stools. He was in his USOH until early November of last year when he had similar symptoms, 4-5 days of fatigue and decreased exercise tolerance with associated chest pain and shortness of breath. The symptoms resolved on their own but recurred every 5-6 weeks. He was evaluated by a Dr. Hogue at [place] and was diagnosed with idiopathic pericarditis and started on Colchicine and Prednisone 3 weeks ago. A few days ago he became short of breath again and noted some episodes of diarrhea with dark stools and some bright red blood on the toilet paper. The BRBPR was not new, but the dark stools were. The last episode was early yesterday (3/28). He had his INR checked on Monday and it was noted to be 14.4. 2 weeks prior his INR was ~1.9-2.2. The patient had been on a stable dose of coumadin 4mg for years and so his doctor had it repeated today and it was again >14 and he presented to [facility] for evaluation.     On arrival to [facility] VS: Tm 98.6, HR 60-80, /78, RR 18 SpO2 100%RA. INR 13.75 Hg 9.8 FOBT+. He was given 2L NS, started on a PPI drip, 10mg Vit K and KCENTRA and then transferred to [facility] for further care.     He also reports 2 months of decreased appetite (although his appetite has improved on the prednisone) associated with a 12 pound weight loss and frequent night sweats sometimes sweating through 5 shirts in a single night. He also reports chronic fecal incontinence to the point of wearing a diaper while travelling, but without associated parasthesias .    No recent changes in his diet. No supplement use except for a probiotic. He denies any current CP, SOB or fatigue. Denies any F/C/N/V. No active diarrhea or constipation. No urinary symptoms.

## 2018-03-29 NOTE — H&P ADULT - NSHPSOCIALHISTORY_GEN_ALL_CORE
Former smoker, quit 38 years ago. 1-1.5 packs for 17 years.  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  Remote marijuana use as a teenager.

## 2018-03-29 NOTE — H&P ADULT - ATTENDING COMMENTS
This patient was d/w the attending at , residents at [facility].  The patient was evaluated at bedside and management decisions were made.  See above for the details.  -SOB  -acute hemorrhagic anemia  -GIB  -supratherapeutic INR  -afib - rate controlled  -Gout  >CXR  >IV large bore x2  >hold metoprolol and diltiazem  >IVF  >PPI gtt  >GI consultation  >CBC now and Q4hrs  >was given vitamin and Kcentra; f/u INR  >stool studies, procalcalcitonin  >scds

## 2018-03-29 NOTE — H&P ADULT - ASSESSMENT
77M pmhx AFib, newly diagnosed pericarditis with anemia and likely GIB 2/2 supratherapeutic INR in the setting of recent medication changes, prednisone/colchicine 77M pmhx AFib, newly diagnosed pericarditis with anemia and likely GIB 2/2 supratherapeutic INR in the setting of recent medication changes, prednisone/colchicine. Also with 12 pound weight loss, decreased appetite and night sweats over the past two months.     PLAN:     HEME/ONC  #Acute hemorrhagic anemia 2/2 GIB in the setting of a supratherapeutic INR after recent initiation of prednisone.   - s/p Vitamin K and KCentra with adequate reversal of coumadin. (INR < 2)  - Q2hr CBCs  - Tx goal 8-9.   - maintain platelets > 50k.   - Maintain active T&S.  - R AC and L forearm pIV.   - Patient hemodynamically stable. GI consult in AM or sooner if patient deteriorates.    #B-symptoms with fecal incontinence and weight loss concerning for malignancy also with significant family history of lung malignancy.  - Will need further workup and imaging after acute issues are resolved.    CARDIOVASCULAR  #AF on coumadin    #idiopathic pericarditis diagnosed 3 weeks ago at [facility] (Cardiologist Dr. Hogue)  - Has been taking 20mg prednisone BID for 3 weeks. Will need to be tapered.   Decreased dose to 10mg BID as was likely interacting with Warfarin.  - Home colchicine held.     PULMONARY    RENAL    NEURO    GI/FEN - no IVF; replete lytes prn; NPO    PPx - PPI SCDs    LINES - R AC and L forearm 20ga pIV.     CODE - FULL.    DISPO - continue intensive level of care on 5east CCU. 77M pmhx AFib, newly diagnosed pericarditis with anemia and likely GIB 2/2 supratherapeutic INR in the setting of recent medication changes, prednisone/colchicine. Also with 12 pound weight loss, decreased appetite and night sweats over the past two months.     PLAN:     HEME/ONC  #Acute hemorrhagic anemia 2/2 GIB in the setting of a supratherapeutic INR after recent initiation of prednisone.   - s/p Vitamin K and KCentra with adequate reversal of coumadin. (INR < 2)  - Q2hr CBCs  - Tx goal 8-9 if active bleeding, > 7 otherwise.   - maintain platelets > 50k.   - Maintain active T&S.  - R AC and L forearm pIV.   - Patient hemodynamically stable. GI consult in AM or sooner if patient deteriorates.    #B-symptoms with fecal incontinence and weight loss concerning for malignancy also with significant family history of lung malignancy.  - Will need further workup and imaging after acute issues are resolved.     CARDIOVASCULAR  #AF on coumadin  - Home coumadin held.  - EKG showing AF w/ poor R-wave progression. No ischemic changes.  - Trop negative x1.   - Home diltiazem/metoprolol held.    #idiopathic pericarditis diagnosed 3 weeks ago at [facility] (Cardiologist Dr. Hogue)  - Has been taking 20mg prednisone BID for 3 weeks. Will need to be tapered.   Decreased dose to 10mg BID as was likely interacting with Warfarin.  - Home colchicine held.     PULMONARY  - f/u official read of CXR. Appears abnormal.    RENAL  -CAROL on admission     NEURO    GI/FEN - no IVF; replete lytes prn; NPO    PPx - PPI SCDs    LINES - R AC and L forearm 20ga pIV.     CODE - FULL.    DISPO - continue intensive level of care on 5east CCU. 77M pmhx AFib, newly diagnosed pericarditis with anemia and likely GIB 2/2 supratherapeutic INR in the setting of recent medication changes, prednisone/colchicine. Also with 12 pound weight loss, decreased appetite and night sweats over the past two months.     PLAN:     HEME/ONC  #Acute hemorrhagic anemia 2/2 GIB in the setting of a supratherapeutic INR after recent initiation of prednisone.   - s/p Vitamin K and KCentra with adequate reversal of coumadin. (INR < 2)  - Q2hr CBCs  - Tx goal 8-9 if active bleeding, > 7 otherwise.   - Monitor for signs of acute bleeding.  - maintain platelets > 50k.   - Maintain active T&S.  - R AC and L forearm pIV.   - Patient hemodynamically stable. GI consult in AM or sooner if patient deteriorates.    #B-symptoms with fecal incontinence and weight loss concerning for malignancy also with significant family history of lung malignancy.  - Will need further workup and imaging after acute issues are resolved.     CARDIOVASCULAR  #AF on coumadin  - Home coumadin held.  - EKG showing AF w/ poor R-wave progression. No ischemic changes.  - Trop negative x1.   - Home diltiazem/metoprolol held.    #idiopathic pericarditis diagnosed 3 weeks ago at [facility] (Cardiologist Dr. Hogue)  - Has been taking 20mg prednisone BID for 3 weeks. Will need to be tapered.   Decreased dose to 10mg BID as was likely interacting with Warfarin.  - Home colchicine held.     PULMONARY  - f/u official read of CXR.  - Maintain O2 sat > 94%.     RENAL  -CAROL on admission   Cr 1.48 improved to 1.07 after IVF.   Lactate improved after IVF.     NEURO  - Monitor for signs of pain.     GI/FEN - IVF as needed; replete lytes prn; NPO  - PPI drip.     PPx - PPI SCDs    LINES - R AC and L forearm 20ga pIV.     CODE - FULL.    DISPO - continue intensive level of care on 5east CCU. 77M pmhx AFib, newly diagnosed pericarditis with anemia and likely GIB 2/2 supratherapeutic INR in the setting of recent medication changes, prednisone/colchicine. Also with 12 pound weight loss, decreased appetite and night sweats over the past two months.     PLAN:     HEME/ONC  #Acute hemorrhagic anemia 2/2 GIB in the setting of a supratherapeutic INR after recent initiation of prednisone.   - s/p Vitamin K and KCentra with adequate reversal of coumadin. (INR < 2)  - Q2hr CBCs  - Tx goal 8-9 if active bleeding, > 7 otherwise.   - Monitor for signs of acute bleeding.  - maintain platelets > 50k.   - Maintain active T&S.  - R AC and L forearm pIV.   - Patient hemodynamically stable. GI consult in AM or sooner if patient deteriorates.    #B-symptoms with fecal incontinence and weight loss concerning for malignancy also with significant family history of lung malignancy.  - Will need further workup and imaging after acute issues are resolved.     CARDIOVASCULAR  #AF on coumadin  - Home coumadin held.  - EKG showing AF w/ poor R-wave progression. No ischemic changes.  - Trop negative x1.   - Home diltiazem/metoprolol held.    #idiopathic pericarditis diagnosed 3 weeks ago at [facility] (Cardiologist Dr. Hogue)  - Has been taking 20mg prednisone BID for 3 weeks. Will need to be tapered.   Decreased dose to 10mg BID as was likely interacting with Warfarin.  - Home colchicine held.     PULMONARY  - f/u official read of CXR.  - Maintain O2 sat > 94%.     RENAL  -CAROL on admission   Cr 1.48 improved to 1.07 after IVF.   Lactate improved after IVF.     NEURO  - Monitor for signs of pain.     GI/FEN - IVF as needed; replete lytes prn; NPO  - PPI drip.     PPx - PPI SCDs    LINES - R AC and L forearm 20ga pIV.     CODE - FULL.    DISPO - MICU

## 2018-03-29 NOTE — H&P ADULT - NSHPPHYSICALEXAM_GEN_ALL_CORE
.  VITAL SIGNS:  T(C): 36.6 (03-29-18 @ 01:43), Max: 37 (03-28-18 @ 20:56)  T(F): 97.8 (03-29-18 @ 01:43), Max: 98.6 (03-28-18 @ 20:56)  HR: 74 (03-29-18 @ 03:11) (60 - 82)  BP: 117/69 (03-29-18 @ 03:11) (109/56 - 145/78)  BP(mean): 84 (03-29-18 @ 03:11) (84 - 97)  RR: 35 (03-29-18 @ 03:11) (16 - 35)  SpO2: 99% (03-29-18 @ 03:11) (98% - 100%)  Wt(kg): --    PHYSICAL EXAM:    Constitutional: WDWN resting comfortably in bed; NAD  Head: NC/AT  Eyes: PERRL, EOMI, anicteric sclera  ENT: no nasal discharge; uvula midline, no oropharyngeal erythema or exudates; MMM  Neck: supple; no JVD or thyromegaly  Respiratory: CTA B/L; no W/R/R, no retractions  Cardiac: +S1/S2; Irregular; no M/R/G; PMI non-displaced  Gastrointestinal: soft, NT/ND; no rebound or guarding; +BSx4  Genitourinary: normal external genitalia  Back: spine w/ scoliosis, no bony tenderness or step-offs; no CVAT B/L  Extremities: WWP, no clubbing or cyanosis; no peripheral edema  Musculoskeletal: NROM x4; no joint swelling, tenderness or erythema  Vascular: 2+ radial, femoral, DP/PT pulses B/L  Dermatologic: skin warm, dry and intact; petechiae and ecchymoses over bilateral arms.   Lymphatic: no submandibular, cervical, axillary or inguinal LAD  Neurologic: AAOx3; CNII-XII grossly intact; no focal deficits  Psychiatric: affect and characteristics of appearance, verbalizations, behaviors are appropriate .  VITAL SIGNS:  T(C): 36.6 (03-29-18 @ 01:43), Max: 37 (03-28-18 @ 20:56)  T(F): 97.8 (03-29-18 @ 01:43), Max: 98.6 (03-28-18 @ 20:56)  HR: 74 (03-29-18 @ 03:11) (60 - 82)  BP: 117/69 (03-29-18 @ 03:11) (109/56 - 145/78)  BP(mean): 84 (03-29-18 @ 03:11) (84 - 97)  RR: 35 (03-29-18 @ 03:11) (16 - 35)  SpO2: 99% (03-29-18 @ 03:11) (98% - 100%)  Wt(kg): --    PHYSICAL EXAM:    Constitutional: WDWN resting comfortably in bed; NAD  Head: NC/AT  Eyes: PERRL, EOMI, anicteric sclera  ENT: no nasal discharge; uvula midline, no oropharyngeal erythema or exudates; MMM  Neck: supple; no JVD or thyromegaly  Respiratory: CTA B/L; no W/R/R, no retractions  Cardiac: +S1/S2; Irregular; no M/R/G; PMI non-displaced  Gastrointestinal: soft, NT/ND; no rebound or guarding; +BSx4  Genitourinary: normal external genitalia  Rectal: Internal hemorrhoids. Good rectal tone. Minimal brown stool in rectal vault.  Back: spine w/ scoliosis, no bony tenderness or step-offs; no CVAT B/L  Extremities: WWP, no clubbing or cyanosis; no peripheral edema  Musculoskeletal: NROM x4; no joint swelling, tenderness or erythema  Vascular: 2+ radial, femoral, DP/PT pulses B/L  Dermatologic: skin warm, dry and intact; petechiae and ecchymoses over bilateral arms.   Lymphatic: no submandibular, cervical, axillary or inguinal LAD  Neurologic: AAOx3; CNII-XII grossly intact; no focal deficits  Psychiatric: affect and characteristics of appearance, verbalizations, behaviors are appropriate

## 2018-03-29 NOTE — DIETITIAN INITIAL EVALUATION ADULT. - ENERGY NEEDS
Height 69"; #; #; 100%IBW  BMI 23.6  ABW used for calculations as pt between % of IBW. Needs estimated 2/2 maintenance in older adults

## 2018-03-29 NOTE — PROGRESS NOTE ADULT - PROBLEM SELECTOR PLAN 7
Per outpatient labs on 3/13, patient had a creatine of 1.43 and a BUN of 35. On admission labs, patient was found to have a creatinine of 1.48. AM labs with creatinine of 1.07.   -Monitor AM BMP  -Renally dose medications  -Avoid nephrotoxic agents

## 2018-03-29 NOTE — DIETITIAN INITIAL EVALUATION ADULT. - OTHER INFO
78 yo/male with PMHx Afib, hemorrhoids, gout, pericarditis admitted with anemia likely 2/2 GIB 2/2 supratherapeutic INR. Pt seen in room, awake, alert, very pleasant. Pt endorses usually having very good appetite but about 2 months ago was not feeling well and did not eat at all. Was placed on prednisone and has had improved appetite since. Per diet recall, very heart healthy, well balance diet with oatmeal, fruits, vegetables, eggs, fish, chicken, potatoes, salads. Currently NPO pending possible endoscopy. No N/V/C/D reported at this time. Last BM 3/27. NKFA. Skin intact. No pain at this time. Discussed diet advancement.

## 2018-03-29 NOTE — PROGRESS NOTE ADULT - PROBLEM SELECTOR PLAN 2
Pt had his INR checked on Monday and it was noted to be 14.4. Two weeks prior his INR was ~1.9-2.2. The patient had been on a stable dose of coumadin 4mg for years and so his doctor had it repeated on 3/28, and it was again >14 and he presented to [facility] for evaluation. INR of 1.84 in AM.  -s/p Vitamin K and KCentra with adequate reversal of coumadin. (INR < 2)  -Monitor AM INR

## 2018-03-30 ENCOUNTER — TRANSCRIPTION ENCOUNTER (OUTPATIENT)
Age: 78
End: 2018-03-30

## 2018-03-30 VITALS
TEMPERATURE: 98 F | HEART RATE: 101 BPM | OXYGEN SATURATION: 98 % | DIASTOLIC BLOOD PRESSURE: 87 MMHG | SYSTOLIC BLOOD PRESSURE: 131 MMHG | RESPIRATION RATE: 16 BRPM

## 2018-03-30 LAB
ALBUMIN SERPL ELPH-MCNC: 3.2 G/DL — LOW (ref 3.3–5)
ALP SERPL-CCNC: 60 U/L — SIGNIFICANT CHANGE UP (ref 40–120)
ALT FLD-CCNC: 128 U/L — HIGH (ref 10–45)
ANION GAP SERPL CALC-SCNC: 10 MMOL/L — SIGNIFICANT CHANGE UP (ref 5–17)
APTT BLD: 31.8 SEC — SIGNIFICANT CHANGE UP (ref 27.5–37.4)
AST SERPL-CCNC: 55 U/L — HIGH (ref 10–40)
BILIRUB SERPL-MCNC: 0.7 MG/DL — SIGNIFICANT CHANGE UP (ref 0.2–1.2)
BUN SERPL-MCNC: 28 MG/DL — HIGH (ref 7–23)
CALCIUM SERPL-MCNC: 8.5 MG/DL — SIGNIFICANT CHANGE UP (ref 8.4–10.5)
CHLORIDE SERPL-SCNC: 100 MMOL/L — SIGNIFICANT CHANGE UP (ref 96–108)
CO2 SERPL-SCNC: 29 MMOL/L — SIGNIFICANT CHANGE UP (ref 22–31)
CREAT SERPL-MCNC: 1.36 MG/DL — HIGH (ref 0.5–1.3)
EOSINOPHIL NFR BLD AUTO: 0.1 % — SIGNIFICANT CHANGE UP (ref 0–6)
GLUCOSE SERPL-MCNC: 113 MG/DL — HIGH (ref 70–99)
HCT VFR BLD CALC: 29.6 % — LOW (ref 39–50)
HGB BLD-MCNC: 9.3 G/DL — LOW (ref 13–17)
INR BLD: 1.48 — HIGH (ref 0.88–1.16)
LYMPHOCYTES # BLD AUTO: 3.9 % — LOW (ref 13–44)
MAGNESIUM SERPL-MCNC: 2.1 MG/DL — SIGNIFICANT CHANGE UP (ref 1.6–2.6)
MCHC RBC-ENTMCNC: 26.6 PG — LOW (ref 27–34)
MCHC RBC-ENTMCNC: 31.4 G/DL — LOW (ref 32–36)
MCV RBC AUTO: 84.6 FL — SIGNIFICANT CHANGE UP (ref 80–100)
MONOCYTES NFR BLD AUTO: 6.1 % — SIGNIFICANT CHANGE UP (ref 2–14)
NEUTROPHILS NFR BLD AUTO: 89.9 % — HIGH (ref 43–77)
PLATELET # BLD AUTO: 124 K/UL — LOW (ref 150–400)
POTASSIUM SERPL-MCNC: 4.2 MMOL/L — SIGNIFICANT CHANGE UP (ref 3.5–5.3)
POTASSIUM SERPL-SCNC: 4.2 MMOL/L — SIGNIFICANT CHANGE UP (ref 3.5–5.3)
PROT SERPL-MCNC: 5 G/DL — LOW (ref 6–8.3)
PROTHROM AB SERPL-ACNC: 16.6 SEC — HIGH (ref 9.8–12.7)
RBC # BLD: 3.5 M/UL — LOW (ref 4.2–5.8)
RBC # FLD: 20.6 % — HIGH (ref 10.3–16.9)
SODIUM SERPL-SCNC: 139 MMOL/L — SIGNIFICANT CHANGE UP (ref 135–145)
WBC # BLD: 11.1 K/UL — HIGH (ref 3.8–10.5)
WBC # FLD AUTO: 11.1 K/UL — HIGH (ref 3.8–10.5)

## 2018-03-30 PROCEDURE — 36415 COLL VENOUS BLD VENIPUNCTURE: CPT

## 2018-03-30 PROCEDURE — 81003 URINALYSIS AUTO W/O SCOPE: CPT

## 2018-03-30 PROCEDURE — 86850 RBC ANTIBODY SCREEN: CPT

## 2018-03-30 PROCEDURE — C9132: CPT

## 2018-03-30 PROCEDURE — 80053 COMPREHEN METABOLIC PANEL: CPT

## 2018-03-30 PROCEDURE — 83690 ASSAY OF LIPASE: CPT

## 2018-03-30 PROCEDURE — 85025 COMPLETE CBC W/AUTO DIFF WBC: CPT

## 2018-03-30 PROCEDURE — 86900 BLOOD TYPING SEROLOGIC ABO: CPT

## 2018-03-30 PROCEDURE — 86140 C-REACTIVE PROTEIN: CPT

## 2018-03-30 PROCEDURE — 84484 ASSAY OF TROPONIN QUANT: CPT

## 2018-03-30 PROCEDURE — 93005 ELECTROCARDIOGRAM TRACING: CPT

## 2018-03-30 PROCEDURE — 85610 PROTHROMBIN TIME: CPT

## 2018-03-30 PROCEDURE — 85027 COMPLETE CBC AUTOMATED: CPT

## 2018-03-30 PROCEDURE — 99239 HOSP IP/OBS DSCHRG MGMT >30: CPT

## 2018-03-30 PROCEDURE — 82272 OCCULT BLD FECES 1-3 TESTS: CPT

## 2018-03-30 PROCEDURE — C9113: CPT

## 2018-03-30 PROCEDURE — 84145 PROCALCITONIN (PCT): CPT

## 2018-03-30 PROCEDURE — 71045 X-RAY EXAM CHEST 1 VIEW: CPT

## 2018-03-30 PROCEDURE — 99285 EMERGENCY DEPT VISIT HI MDM: CPT | Mod: 25

## 2018-03-30 PROCEDURE — 83735 ASSAY OF MAGNESIUM: CPT

## 2018-03-30 PROCEDURE — 96375 TX/PRO/DX INJ NEW DRUG ADDON: CPT

## 2018-03-30 PROCEDURE — 86901 BLOOD TYPING SEROLOGIC RH(D): CPT

## 2018-03-30 PROCEDURE — 85730 THROMBOPLASTIN TIME PARTIAL: CPT

## 2018-03-30 PROCEDURE — 83605 ASSAY OF LACTIC ACID: CPT

## 2018-03-30 PROCEDURE — 96374 THER/PROPH/DIAG INJ IV PUSH: CPT

## 2018-03-30 PROCEDURE — 83880 ASSAY OF NATRIURETIC PEPTIDE: CPT

## 2018-03-30 RX ORDER — COLCHICINE 0.6 MG
1 TABLET ORAL
Qty: 0 | Refills: 0 | COMMUNITY

## 2018-03-30 RX ORDER — FAMOTIDINE 10 MG/ML
1 INJECTION INTRAVENOUS
Qty: 60 | Refills: 0 | OUTPATIENT
Start: 2018-03-30 | End: 2018-04-28

## 2018-03-30 RX ORDER — FAMOTIDINE 10 MG/ML
20 INJECTION INTRAVENOUS
Qty: 0 | Refills: 0 | Status: DISCONTINUED | OUTPATIENT
Start: 2018-03-30 | End: 2018-03-30

## 2018-03-30 RX ORDER — WARFARIN SODIUM 2.5 MG/1
4 TABLET ORAL
Qty: 0 | Refills: 0 | COMMUNITY

## 2018-03-30 RX ORDER — PANTOPRAZOLE SODIUM 20 MG/1
2 TABLET, DELAYED RELEASE ORAL
Qty: 0 | Refills: 0 | COMMUNITY

## 2018-03-30 RX ORDER — DILTIAZEM HCL 120 MG
240 CAPSULE, EXT RELEASE 24 HR ORAL DAILY
Qty: 0 | Refills: 0 | Status: DISCONTINUED | OUTPATIENT
Start: 2018-03-30 | End: 2018-03-30

## 2018-03-30 RX ADMIN — FAMOTIDINE 20 MILLIGRAM(S): 10 INJECTION INTRAVENOUS at 16:43

## 2018-03-30 RX ADMIN — Medication 20 MILLIGRAM(S): at 06:14

## 2018-03-30 RX ADMIN — PANTOPRAZOLE SODIUM 40 MILLIGRAM(S): 20 TABLET, DELAYED RELEASE ORAL at 16:44

## 2018-03-30 RX ADMIN — Medication 240 MILLIGRAM(S): at 13:51

## 2018-03-30 RX ADMIN — Medication 20 MILLIGRAM(S): at 16:46

## 2018-03-30 RX ADMIN — DULOXETINE HYDROCHLORIDE 60 MILLIGRAM(S): 30 CAPSULE, DELAYED RELEASE ORAL at 11:20

## 2018-03-30 RX ADMIN — Medication 100 MILLIGRAM(S): at 13:51

## 2018-03-30 RX ADMIN — Medication 100 MILLIGRAM(S): at 06:14

## 2018-03-30 RX ADMIN — Medication 50 MILLIGRAM(S): at 06:14

## 2018-03-30 RX ADMIN — PANTOPRAZOLE SODIUM 40 MILLIGRAM(S): 20 TABLET, DELAYED RELEASE ORAL at 06:14

## 2018-03-30 NOTE — DISCHARGE NOTE ADULT - SECONDARY DIAGNOSIS.
Anemia due to blood loss Elevated INR Acute pericarditis, unspecified type Chronic atrial fibrillation Depression Transaminitis

## 2018-03-30 NOTE — DISCHARGE NOTE ADULT - MEDICATION SUMMARY - MEDICATIONS TO STOP TAKING
I will STOP taking the medications listed below when I get home from the hospital:    Coumadin  -- 4 milligram(s) by mouth once a day (at bedtime)    colchicine 0.6 mg oral tablet  -- 1 tab(s) by mouth once a day

## 2018-03-30 NOTE — DISCHARGE NOTE ADULT - CARE PROVIDERS DIRECT ADDRESSES
[email or web address],DirectAddress_Unknown,DirectAddress_Unknown ,DirectAddress_Unknown,DirectAddress_Unknown

## 2018-03-30 NOTE — PROGRESS NOTE ADULT - ASSESSMENT
77yr old M with PMHx significant for AFib (on AC with coumadin), Hemorrhoids (with intermittent BRBPR on wiping and sometimes coating stool), Gout, Depression who was admitted from [facility] for evaluation of melena, SOB, and fatigue.   GI was consulted for evaluation of melena.     # GI Bleed -   - in setting of supratherapeutic INR  - ddx - PUD, gastritis, diverticular bleed, AVMs, hemorrhoidal bleed  - Hgb stable  - trend Hgb  - no further active bleeding  - rectal negative for melena  - INR now corrected  - given stable Hgb, no active bleeding (hematemesis, coffee grounds, melena, BRBPR) we will hold off on endoscopic intervention at this time as likely his bleeding was due to the supraRx INR  - he had a colonoscopy 2yrs ago which he reports they only found one polyp, and diverticulosis - so less likely this is 2/2 a malignancy  - team may restart AC per their consideration and monitor for repeat bleeding  - if patient needs continued colchicine and prednisone - his INR needs to be monitored more closely    Discussed with attending Dr Mcgee  GI will sign off for now, please reconsult as needed

## 2018-03-30 NOTE — DISCHARGE NOTE ADULT - PLAN OF CARE
Outpatient followup You were admitted to the hospital for a GI tract bleed in the setting of a supratherapeutic INR. You had no episodes of melena or bright red blood in your stool. The gastroenterology service was consulted for the GI bleed and held off on scoping as your hemoglobin was stable, your INR had normalized, and you had no active bleeding while in the hospital. If you experience bright red blood in your stool or dark, tarry stools please seek immediate medical care. Please followup with your primary care provider for continued monitoring. I have provided the contact information for Dr. Mcgee, the gastroenterologist who saw you in the hospital. You have a reported baseline hemoglobin between 12 and 14 per outpatient records. On admission to the hospital, you were found to have a hemoglobin of 9.8, which remained stable for the duration of your stay. You received no blood transfusions during your stay. Please followup with your primary care provider for continued monitor. If you experience blood loss again, please seek medical attention. You were found to have a supratherapeutic INR of >14 on outpatient labs and were instructed to go to the emergency department for further evaluation of your elevated INR. Your elevated INR predisposed you to bleeding and was the presumed cause of your anemia and GI bleeding. You received Kcentra and vitamin K to reverse your elevated INR. You were admitted to the hospital for a GI tract bleed in the setting of a supratherapeutic INR. You had no episodes of melena or bright red blood in your stool. The gastroenterology service was consulted for the GI bleed and held off on scoping as your hemoglobin was stable, your INR had normalized, and you had no active bleeding while in the hospital. If you experience bright red blood in your stool or dark, tarry stools please seek immediate medical care. Please followup with your primary care provider for continued monitoring. Please call to confirm your appointment with Dr. Mcgee within a week for endoscopy as an outpatient. Please stop taking your colchicine. You can continue to take your prednisone as directed. Please followup with Dr. Blanc for continued monitoring. Please take your metoprolol and diltiazem as directed. Your dose of warfarin has been reduced for ___. No changes were made to your medication for depression. Please followup with your primary care provider. Please avoid medications that may You were admitted to the hospital for a GI tract bleed in the setting of a supratherapeutic INR. You had no episodes of melena or bright red blood in your stool. The gastroenterology service was consulted for the GI bleed and held off on scoping as your hemoglobin was stable, your INR had normalized, and you had no active bleeding while in the hospital. If you experience bright red blood in your stool or dark, tarry stools please seek immediate medical care. Please followup with your primary care provider for continued monitoring. Please call to confirm your appointment with Dr. Polo on Monday as an outpatient. Dr. Polo will assist with setting up an endoscopy as an outpatient. Please stop taking your colchicine and warfarin until you followup with your primary care provider and rheumatologist next week. You have a reported baseline hemoglobin between 12 and 14 per outpatient records. On admission to the hospital, you were found to have a hemoglobin of 9.8, which remained stable for the duration of your stay. You received no blood transfusions during your stay. Please followup with your primary care provider for continued monitoring. If you experience blood loss again, please seek immediate medical attention. Please stop taking your colchicine. You can continue to take your prednisone as directed. Please followup with Dr. Blanc for continued monitoring. If you develop chest pain, shortness of breath, or weakness, please seek immediate medical attention. Please take your metoprolol and diltiazem as directed. You must stop taking your warfarin until you followup with you Dr. Polo on Monday who will determine a safe time to resume the medication. You were found to have elevations in your liver function tests, specifically AST and ALT. Your outpatient labs from earlier in the month demonstrated similar elevations. Please avoid hepatotoxic agents such as alcohol and acetaminophen. Please followup with your primary care provider for continued management.

## 2018-03-30 NOTE — DISCHARGE NOTE ADULT - MEDICATION SUMMARY - MEDICATIONS TO TAKE
I will START or STAY ON the medications listed below when I get home from the hospital:    predniSONE 20 mg oral tablet  -- 1 tab(s) by mouth 2 times a day  -- Indication: For Acute pericarditis, unspecified type    dilTIAZem 240 mg/24 hours oral capsule, extended release  -- 1 cap(s) by mouth once a day  -- Indication: For Chronic atrial fibrillation    DULoxetine 60 mg oral delayed release capsule  -- 1 cap(s) by mouth once a day  -- Indication: For Depression    allopurinol 100 mg oral tablet  -- 1 tab(s) by mouth 2 times a day  -- Indication: For Gout, unspecified cause, unspecified chronicity, unspecified site    metoprolol succinate 50 mg oral tablet, extended release  -- 1 tab(s) by mouth once a day  -- Indication: For Chronic atrial fibrillation    famotidine 20 mg oral tablet  -- 1 tab(s) by mouth 2 times a day  -- Indication: For Gastrointestinal hemorrhage with melena    pantoprazole 20 mg oral delayed release tablet  -- 2 tab(s) by mouth 2 times a day  -- Indication: For Gastrointestinal hemorrhage with melena

## 2018-03-30 NOTE — DISCHARGE NOTE ADULT - HOSPITAL COURSE
77M pmhx AFib (coumadin), hemorrhoids, gout, depression who presented to [facility] with 4-5 days of fatigue, shortness of breath and dark stools. He was in his USOH until early November of last year when he had similar symptoms, 4-5 days of fatigue and decreased exercise tolerance with associated chest pain and shortness of breath. The symptoms resolved on their own but recurred every 5-6 weeks. He was evaluated by a Dr. Blanc at [place] and was diagnosed with idiopathic pericarditis and started on Colchicine and Prednisone 3 weeks ago. A few days ago he became short of breath again and noted some episodes of diarrhea with dark stools and some bright red blood on the toilet paper. The BRBPR was not new, but the dark stools were. The last episode was early yesterday (3/28). He had his INR checked on Monday and it was noted to be 14.4. Two weeks prior his INR was ~1.9-2.2. The patient had been on a stable dose of coumadin 4mg for years and so his doctor had it repeated on 3/28, and it was again >14 and he presented to [facility] for evaluation. On arrival to [facility] patient was hemodynamically stable but labs were significant for INR of 13.75, Hg 9.8 (12.8 on 3/13) FOBT+. He was given 2L NS, started on a PPI drip, 10mg Vit K and KCENTRA and then transferred to [facility] for further care. AM INR was found to be 1.84 and Hgb was 8.6. GI was consulted, recommending no acute intervention. Patient's opt rheumatologist recommended c/w prednisone to home 20mg BID and holding colchicine given recent hx of pericarditis. Patient has had no further episodes of melena and is hemodynamically stable for transfer to the [facility]. 77M pmhx AFib (coumadin), hemorrhoids, gout, depression who presented to [facility] with 4-5 days of fatigue, shortness of breath and dark stools. He was in his USOH until early November of last year when he had similar symptoms, 4-5 days of fatigue and decreased exercise tolerance with associated chest pain and shortness of breath. The symptoms resolved on their own but recurred every 5-6 weeks. He was evaluated by a Dr. Blanc at [place] [facility] and was diagnosed with idiopathic pericarditis and started on Colchicine and Prednisone 3 weeks ago. A few days ago he became short of breath again and noted some episodes of diarrhea with dark stools and some bright red blood on the toilet paper. The BRBPR was not new, but the dark stools were. The last episode was early yesterday (3/28). He had his INR checked on Monday and it was noted to be 14.4. Two weeks prior his INR was ~1.9-2.2. The patient had been on a stable dose of coumadin 4mg for years and so his doctor had it repeated on 3/28, and it was again >14 and he presented to [facility] for evaluation. On arrival to [facility] patient was hemodynamically stable but labs were significant for INR of 13.75, Hg 9.8 (12.8 on 3/13) FOBT+. He was given 2L NS, started on a PPI drip, 10mg Vit K and KCENTRA and then transferred to [facility] for further care. AM INR was found to be 1.84 and Hgb was 8.6. GI was consulted, recommending no acute intervention and for outpatient endoscopy. Patient's opt rheumatologist recommended c/w prednisone to home 20mg BID and holding colchicine given recent hx of pericarditis. Patient has had no further episodes of melena and remained hemodynamically stable throughout his hospital stay. Medical team spoke with patient's primary care provider who will see patient early next week and will schedule outpatient endoscopy. The patient's rheumatologist was also contacted and will see the patient early next week. The patient was instructed on signs and symptoms of dangerous GI bleeding and expressed an understanding of seeking immediate medical care should such a situation occur again. Patient was discharged to home with close outpatient followup.     Discharge counseling was provided using the COMPLETES-NOW protocol. Teach back was demonstrated by the patient.

## 2018-03-30 NOTE — DISCHARGE NOTE ADULT - PROVIDER TOKENS
TOKEN:'[id number]',FREE:[LAST:[Christ],FIRST:[Curly],PHONE:[phone]],FAX:[(   )    -],ADDRESS:[[address] [place] [address] [id number]  [place] [address]]],FREE:[LAST:[Blume],FIRST:[Josse],PHONE:[phone]],FAX:[(   )    -],ADDRESS:[[address] [place] [address] [address]  [place] [address]]] FREE:[LAST:[Christ],FIRST:[Curly],PHONE:[phone]],FAX:[(   )    -],ADDRESS:[[address] [facility] [address]  [place] [address]]],FREE:[LAST:[Jayashree],FIRST:[Josse],PHONE:[phone]],FAX:[(   )    -],ADDRESS:[[address]  [place] [address]]]

## 2018-03-30 NOTE — DISCHARGE NOTE ADULT - CARE PROVIDER_API CALL
Sin Mcgee), Medicine  [address]  [place] [address]  Phone: [phone]  Fax: [phone]    Curly Polo  [address] [facility] [address]  [place] [address]  Phone: [phone]  Fax: (   )    -    Josse Blanc  [address]  [place] [address]  Phone: [phone]  Fax: (   )    - Curly Polo  [address] [facility]  [place] [address]  Phone: [phone]  Fax: (   )    -    Josse Blanc  [address]  [place] [address]  Phone: [phone]  Fax: (   )    -

## 2018-03-30 NOTE — PROGRESS NOTE ADULT - SUBJECTIVE AND OBJECTIVE BOX
Pt seen and examined at bedside  No new c/o  Feels ok today  Denies melena, n/v/d, abdominal pain      MEDICATIONS:  MEDICATIONS  (STANDING):  allopurinol 100 milliGRAM(s) Oral three times a day  DULoxetine 60 milliGRAM(s) Oral daily  metoprolol succinate ER 50 milliGRAM(s) Oral daily  pantoprazole  Injectable 40 milliGRAM(s) IV Push every 12 hours  predniSONE   Tablet 20 milliGRAM(s) Oral two times a day    MEDICATIONS  (PRN):    Allergies    penicillin (Rash)  sulfa drugs (Rash)    Intolerances      Vital Signs Last 24 Hrs  T(C): 36.5 (30 Mar 2018 04:52), Max: 36.7 (29 Mar 2018 12:16)  T(F): 97.7 (30 Mar 2018 04:52), Max: 98.1 (29 Mar 2018 12:16)  HR: 95 (30 Mar 2018 04:52) (82 - 103)  BP: 127/83 (30 Mar 2018 04:52) (124/79 - 146/78)  BP(mean): 100 (29 Mar 2018 11:00) (100 - 103)  RR: 15 (30 Mar 2018 04:52) (15 - 24)  SpO2: 99% (30 Mar 2018 04:52) (99% - 100%)     @ 07:01  -  - @ 07:00  --------------------------------------------------------  IN: 80 mL / OUT: 400 mL / NET: -320 mL      PHYSICAL EXAM:  GEN - elderly M lying in bed in no distress, anicteric, afebrile, not pale  Gastrointestinal: full, soft, BS+, NT, NR, NG, no masses or organs palpated  Extremities: no edema  Neurological: AAOx3 non focal  Skin: intact    LABS:  CBC Full  -  ( 30 Mar 2018 06:11 )  WBC Count : 11.1 K/uL  Hemoglobin : 9.3 g/dL  Hematocrit : 29.6 %  Platelet Count - Automated : 124 K/uL  Mean Cell Volume : 84.6 fL  Mean Cell Hemoglobin : 26.6 pg  Mean Cell Hemoglobin Concentration : 31.4 g/dL  Auto Neutrophil % : 89.9 %  Auto Lymphocyte % : 3.9 %  Auto Monocyte % : 6.1 %  Auto Eosinophil % : 0.1 %    139  |  100  |  28<H>  ----------------------------<  113<H>  4.2   |  29  |  1.36<H>    Ca    8.5      30 Mar 2018 06:11  Mg     2.1     03-30    TPro  5.0<L>  /  Alb  3.2<L>  /  TBili  0.7  /  DBili  x   /  AST  55<H>  /  ALT  128<H>  /  AlkPhos  60  03-30    PT/INR - ( 30 Mar 2018 06:11 )   PT: 16.6 sec;   INR: 1.48       PTT - ( 30 Mar 2018 06:11 )  PTT:31.8 sec    Urinalysis Basic - ( 29 Mar 2018 00:17 )  Color: Yellow / Appearance: Clear / S.020 / pH: x  Gluc: x / Ketone: NEGATIVE  / Bili: NEGATIVE / Urobili: 0.2 E.U./dL   Blood: x / Protein: NEGATIVE mg/dL / Nitrite: NEGATIVE   Leuk Esterase: NEGATIVE / RBC: x / WBC x   Sq Epi: x / Non Sq Epi: x / Bacteria: x            RADIOLOGY & ADDITIONAL STUDIES (The following images were personally reviewed):

## 2018-03-30 NOTE — DISCHARGE NOTE ADULT - ADDITIONAL INSTRUCTIONS
Please followup with Dr. Polo and Dr. Blanc within a week of discharge from the hospital. You may also wish to followup with Dr. Mcgee Please followup with Dr. Polo on Monday (4/2) morning. He will assist with setting up endoscopy as an outpatient. You will also need to followup with Dr. Blanc on Tuesday (4/3).

## 2018-03-30 NOTE — DISCHARGE NOTE ADULT - PATIENT PORTAL LINK FT
You can access the [facility] Patient Portal, offered by [facility], by registering with the following website: [email or web address]

## 2018-03-30 NOTE — DISCHARGE NOTE ADULT - CARE PLAN
Principal Discharge DX:	Gastrointestinal hemorrhage with melena  Secondary Diagnosis:	Anemia due to blood loss  Secondary Diagnosis:	Elevated INR  Secondary Diagnosis:	Acute pericarditis, unspecified type  Secondary Diagnosis:	Chronic atrial fibrillation  Secondary Diagnosis:	Depression  Secondary Diagnosis:	Transaminitis Principal Discharge DX:	Gastrointestinal hemorrhage with melena  Goal:	Outpatient followup  Assessment and plan of treatment:	You were admitted to the hospital for a GI tract bleed in the setting of a supratherapeutic INR. You had no episodes of melena or bright red blood in your stool. The gastroenterology service was consulted for the GI bleed and held off on scoping as your hemoglobin was stable, your INR had normalized, and you had no active bleeding while in the hospital. If you experience bright red blood in your stool or dark, tarry stools please seek immediate medical care. Please followup with your primary care provider for continued monitoring. I have provided the contact information for Dr. Mcgee, the gastroenterologist who saw you in the hospital.  Secondary Diagnosis:	Anemia due to blood loss  Goal:	Outpatient followup  Assessment and plan of treatment:	You have a reported baseline hemoglobin between 12 and 14 per outpatient records. On admission to the hospital, you were found to have a hemoglobin of 9.8, which remained stable for the duration of your stay. You received no blood transfusions during your stay. Please followup with your primary care provider for continued monitor. If you experience blood loss again, please seek medical attention.  Secondary Diagnosis:	Elevated INR  Goal:	Outpatient followup  Assessment and plan of treatment:	You were found to have a supratherapeutic INR of >14 on outpatient labs and were instructed to go to the emergency department for further evaluation of your elevated INR. Your elevated INR predisposed you to bleeding and was the presumed cause of your anemia and GI bleeding. You received Kcentra and vitamin K to reverse your elevated INR.  Secondary Diagnosis:	Acute pericarditis, unspecified type  Secondary Diagnosis:	Chronic atrial fibrillation  Secondary Diagnosis:	Depression  Secondary Diagnosis:	Transaminitis Principal Discharge DX:	Gastrointestinal hemorrhage with melena  Goal:	Outpatient followup  Assessment and plan of treatment:	You were admitted to the hospital for a GI tract bleed in the setting of a supratherapeutic INR. You had no episodes of melena or bright red blood in your stool. The gastroenterology service was consulted for the GI bleed and held off on scoping as your hemoglobin was stable, your INR had normalized, and you had no active bleeding while in the hospital. If you experience bright red blood in your stool or dark, tarry stools please seek immediate medical care. Please followup with your primary care provider for continued monitoring. Please call to confirm your appointment with Dr. Mcgee within a week for endoscopy as an outpatient.  Secondary Diagnosis:	Anemia due to blood loss  Goal:	Outpatient followup  Assessment and plan of treatment:	You have a reported baseline hemoglobin between 12 and 14 per outpatient records. On admission to the hospital, you were found to have a hemoglobin of 9.8, which remained stable for the duration of your stay. You received no blood transfusions during your stay. Please followup with your primary care provider for continued monitor. If you experience blood loss again, please seek medical attention.  Secondary Diagnosis:	Elevated INR  Goal:	Outpatient followup  Assessment and plan of treatment:	You were found to have a supratherapeutic INR of >14 on outpatient labs and were instructed to go to the emergency department for further evaluation of your elevated INR. Your elevated INR predisposed you to bleeding and was the presumed cause of your anemia and GI bleeding. You received Kcentra and vitamin K to reverse your elevated INR.  Secondary Diagnosis:	Acute pericarditis, unspecified type  Assessment and plan of treatment:	Please stop taking your colchicine. You can continue to take your prednisone as directed. Please followup with Dr. Blanc for continued monitoring.  Secondary Diagnosis:	Chronic atrial fibrillation  Assessment and plan of treatment:	Please take your metoprolol and diltiazem as directed. Your dose of warfarin has been reduced for ___.  Secondary Diagnosis:	Depression  Assessment and plan of treatment:	No changes were made to your medication for depression. Please followup with your primary care provider.  Secondary Diagnosis:	Transaminitis  Assessment and plan of treatment:	Please avoid medications that may Principal Discharge DX:	Gastrointestinal hemorrhage with melena  Goal:	Outpatient followup  Assessment and plan of treatment:	You were admitted to the hospital for a GI tract bleed in the setting of a supratherapeutic INR. You had no episodes of melena or bright red blood in your stool. The gastroenterology service was consulted for the GI bleed and held off on scoping as your hemoglobin was stable, your INR had normalized, and you had no active bleeding while in the hospital. If you experience bright red blood in your stool or dark, tarry stools please seek immediate medical care. Please followup with your primary care provider for continued monitoring. Please call to confirm your appointment with Dr. Polo on Monday as an outpatient. Dr. Polo will assist with setting up an endoscopy as an outpatient. Please stop taking your colchicine and warfarin until you followup with your primary care provider and rheumatologist next week.  Secondary Diagnosis:	Anemia due to blood loss  Goal:	Outpatient followup  Assessment and plan of treatment:	You have a reported baseline hemoglobin between 12 and 14 per outpatient records. On admission to the hospital, you were found to have a hemoglobin of 9.8, which remained stable for the duration of your stay. You received no blood transfusions during your stay. Please followup with your primary care provider for continued monitoring. If you experience blood loss again, please seek immediate medical attention.  Secondary Diagnosis:	Elevated INR  Goal:	Outpatient followup  Assessment and plan of treatment:	You were found to have a supratherapeutic INR of >14 on outpatient labs and were instructed to go to the emergency department for further evaluation of your elevated INR. Your elevated INR predisposed you to bleeding and was the presumed cause of your anemia and GI bleeding. You received Kcentra and vitamin K to reverse your elevated INR.  Secondary Diagnosis:	Acute pericarditis, unspecified type  Assessment and plan of treatment:	Please stop taking your colchicine. You can continue to take your prednisone as directed. Please followup with Dr. Blanc for continued monitoring. If you develop chest pain, shortness of breath, or weakness, please seek immediate medical attention.  Secondary Diagnosis:	Chronic atrial fibrillation  Assessment and plan of treatment:	Please take your metoprolol and diltiazem as directed. You must stop taking your warfarin until you followup with you Dr. Polo on Monday who will determine a safe time to resume the medication.  Secondary Diagnosis:	Depression  Assessment and plan of treatment:	No changes were made to your medication for depression. Please followup with your primary care provider.  Secondary Diagnosis:	Transaminitis  Assessment and plan of treatment:	You were found to have elevations in your liver function tests, specifically AST and ALT. Your outpatient labs from earlier in the month demonstrated similar elevations. Please avoid hepatotoxic agents such as alcohol and acetaminophen. Please followup with your primary care provider for continued management.

## 2018-04-03 DIAGNOSIS — M10.9 GOUT, UNSPECIFIED: ICD-10-CM

## 2018-04-03 DIAGNOSIS — I30.0 ACUTE NONSPECIFIC IDIOPATHIC PERICARDITIS: ICD-10-CM

## 2018-04-03 DIAGNOSIS — I48.2 CHRONIC ATRIAL FIBRILLATION: ICD-10-CM

## 2018-04-03 DIAGNOSIS — D62 ACUTE POSTHEMORRHAGIC ANEMIA: ICD-10-CM

## 2018-04-03 DIAGNOSIS — K92.2 GASTROINTESTINAL HEMORRHAGE, UNSPECIFIED: ICD-10-CM

## 2018-04-03 DIAGNOSIS — R79.1 ABNORMAL COAGULATION PROFILE: ICD-10-CM

## 2018-04-03 DIAGNOSIS — Z85.72 PERSONAL HISTORY OF NON-HODGKIN LYMPHOMAS: ICD-10-CM

## 2018-04-03 DIAGNOSIS — F32.9 MAJOR DEPRESSIVE DISORDER, SINGLE EPISODE, UNSPECIFIED: ICD-10-CM

## 2018-04-03 DIAGNOSIS — R74.0 NONSPECIFIC ELEVATION OF LEVELS OF TRANSAMINASE AND LACTIC ACID DEHYDROGENASE [LDH]: ICD-10-CM

## 2018-04-03 DIAGNOSIS — Z79.01 LONG TERM (CURRENT) USE OF ANTICOAGULANTS: ICD-10-CM

## 2018-04-23 ENCOUNTER — EMERGENCY (EMERGENCY)
Facility: HOSPITAL | Age: 78
LOS: 1 days | Discharge: HOME | End: 2018-04-23
Attending: EMERGENCY MEDICINE | Admitting: EMERGENCY MEDICINE
Payer: MEDICARE

## 2018-04-23 VITALS
RESPIRATION RATE: 20 BRPM | TEMPERATURE: 98 F | HEART RATE: 89 BPM | OXYGEN SATURATION: 98 % | SYSTOLIC BLOOD PRESSURE: 122 MMHG | DIASTOLIC BLOOD PRESSURE: 75 MMHG

## 2018-04-23 VITALS
SYSTOLIC BLOOD PRESSURE: 133 MMHG | TEMPERATURE: 98 F | OXYGEN SATURATION: 100 % | DIASTOLIC BLOOD PRESSURE: 80 MMHG | HEART RATE: 86 BPM | RESPIRATION RATE: 18 BRPM

## 2018-04-23 DIAGNOSIS — R79.9 ABNORMAL FINDING OF BLOOD CHEMISTRY, UNSPECIFIED: ICD-10-CM

## 2018-04-23 DIAGNOSIS — Z88.0 ALLERGY STATUS TO PENICILLIN: ICD-10-CM

## 2018-04-23 DIAGNOSIS — Z79.899 OTHER LONG TERM (CURRENT) DRUG THERAPY: ICD-10-CM

## 2018-04-23 DIAGNOSIS — Z88.2 ALLERGY STATUS TO SULFONAMIDES: ICD-10-CM

## 2018-04-23 PROBLEM — M10.9 GOUT, UNSPECIFIED: Chronic | Status: ACTIVE | Noted: 2018-03-28

## 2018-04-23 PROBLEM — I48.2 CHRONIC ATRIAL FIBRILLATION: Chronic | Status: ACTIVE | Noted: 2018-03-28

## 2018-04-23 PROBLEM — I30.9 ACUTE PERICARDITIS, UNSPECIFIED: Chronic | Status: ACTIVE | Noted: 2018-03-28

## 2018-04-23 LAB
ALBUMIN SERPL ELPH-MCNC: 2.9 G/DL — LOW (ref 3.4–5)
ALP SERPL-CCNC: 72 U/L — SIGNIFICANT CHANGE UP (ref 40–120)
ALT FLD-CCNC: 74 U/L — HIGH (ref 12–42)
ANION GAP SERPL CALC-SCNC: 7 MMOL/L — LOW (ref 9–16)
AST SERPL-CCNC: 31 U/L — SIGNIFICANT CHANGE UP (ref 15–37)
BILIRUB SERPL-MCNC: 0.6 MG/DL — SIGNIFICANT CHANGE UP (ref 0.2–1.2)
BUN SERPL-MCNC: 35 MG/DL — HIGH (ref 7–23)
CALCIUM SERPL-MCNC: 8.9 MG/DL — SIGNIFICANT CHANGE UP (ref 8.5–10.5)
CHLORIDE SERPL-SCNC: 105 MMOL/L — SIGNIFICANT CHANGE UP (ref 96–108)
CO2 SERPL-SCNC: 28 MMOL/L — SIGNIFICANT CHANGE UP (ref 22–31)
CREAT SERPL-MCNC: 1.22 MG/DL — SIGNIFICANT CHANGE UP (ref 0.5–1.3)
GLUCOSE SERPL-MCNC: 111 MG/DL — HIGH (ref 70–99)
HCT VFR BLD CALC: 35.6 % — LOW (ref 39–50)
HGB BLD-MCNC: 10.6 G/DL — LOW (ref 13–17)
INR BLD: 1.31 — HIGH (ref 0.88–1.16)
MCHC RBC-ENTMCNC: 26.1 PG — LOW (ref 27–34)
MCHC RBC-ENTMCNC: 29.8 G/DL — LOW (ref 32–36)
MCV RBC AUTO: 87.7 FL — SIGNIFICANT CHANGE UP (ref 80–100)
PLATELET # BLD AUTO: 169 K/UL — SIGNIFICANT CHANGE UP (ref 150–400)
POTASSIUM SERPL-MCNC: 4.9 MMOL/L — SIGNIFICANT CHANGE UP (ref 3.5–5.3)
POTASSIUM SERPL-SCNC: 4.9 MMOL/L — SIGNIFICANT CHANGE UP (ref 3.5–5.3)
PROT SERPL-MCNC: 5.7 G/DL — LOW (ref 6.4–8.2)
PROTHROM AB SERPL-ACNC: 14.5 SEC — HIGH (ref 9.8–12.7)
RBC # BLD: 4.06 M/UL — LOW (ref 4.2–5.8)
RBC # FLD: 22 % — HIGH (ref 10.3–16.9)
SODIUM SERPL-SCNC: 140 MMOL/L — SIGNIFICANT CHANGE UP (ref 132–145)
WBC # BLD: 8 K/UL — SIGNIFICANT CHANGE UP (ref 3.8–10.5)
WBC # FLD AUTO: 8 K/UL — SIGNIFICANT CHANGE UP (ref 3.8–10.5)

## 2018-04-23 PROCEDURE — 99284 EMERGENCY DEPT VISIT MOD MDM: CPT | Mod: 25

## 2018-04-23 PROCEDURE — 93010 ELECTROCARDIOGRAM REPORT: CPT

## 2018-04-23 RX ORDER — SODIUM CHLORIDE 9 MG/ML
3 INJECTION INTRAMUSCULAR; INTRAVENOUS; SUBCUTANEOUS ONCE
Qty: 0 | Refills: 0 | Status: COMPLETED | OUTPATIENT
Start: 2018-04-23 | End: 2018-04-23

## 2018-04-23 RX ADMIN — SODIUM CHLORIDE 3 MILLILITER(S): 9 INJECTION INTRAMUSCULAR; INTRAVENOUS; SUBCUTANEOUS at 14:13

## 2018-04-23 NOTE — ED PROVIDER NOTE - CARDIAC, MLM
Normal rate, regular rhythm.  Heart sounds S1, S2.  No murmurs, rubs or gallops.  + 3 pitting lower leg edema

## 2018-04-23 NOTE — ED PROVIDER NOTE - OBJECTIVE STATEMENT
77 y/o M with PMH of gout, afib on Coumadin and idiopathic pericarditis.  He was admitted to [facility] on 3/29/18 for anemia secondary to melena.  He was placed on Clonidine and Prednisone 3 weeks prior to admission for the pericarditis.  His Coumadin was temporarily discontinued.  He has since had a normal outpt colonoscopy.  His dose of Coumadin was restarted at half its usual dose (2mg instead of 4mg) and started on low dose Clonidine.  Today, he was sent to ED for evaluation with concerns of an elevated INR.  His PCP, Dr. Polo had routine labs drawn in the office when the patient started to bleed significant from the venipuncture site.  Pt patients notes he always has BRBPR with brown stools due to known hemorrhoids but had more blood than usual this am.  he denies bleeding from gums, hematuria, dizziness, shortness of breath, chest pain, palpitations, headache, visual changes, abd pain, n/v/d/c.

## 2018-04-23 NOTE — ED ADULT NURSE NOTE - OBJECTIVE STATEMENT
Patient is on Warfarin , sent PMD for uncontrollable bleeding at puncture site, patient was at office for routine PT/INR check . Bleeding now Is controlled no active bleeding noted.

## 2018-04-23 NOTE — ED ADULT TRIAGE NOTE - CHIEF COMPLAINT QUOTE
Patient to ED sent by PMD due to excessive bleeding.  Patient went for routing blood test and could not control bleeding.  Patient on Warfarin.  Requesting PT/INR

## 2018-04-23 NOTE — ED PROVIDER NOTE - ATTENDING CONTRIBUTION TO CARE
Pt with hx of hemorrhoids, on anticoagulation, w episode of hemorrhoidal bleeding, seen by primary care, sent in for INR check, check H/H. no overanticoagulation, stable H/H as compared to prior visits to our ED.

## 2018-04-23 NOTE — ED PROVIDER NOTE - MEDICAL DECISION MAKING DETAILS
79 y/o M presents to ED for INR check.  INR is stable.  Results discussed with Dr. Polo and patient.  No other concerns.  H&H is gradually improving since last admission (compared with Dr. Polo's outpt lab results). Pt discharged with return precautions.

## 2018-04-23 NOTE — ED PROVIDER NOTE - CONDUCTED A DETAILED DISCUSSION WITH PATIENT AND/OR GUARDIAN REGARDING, MDM
need for outpatient follow-up/return to ED if symptoms worsen, persist or questions arise need for outpatient follow-up/lab results/return to ED if symptoms worsen, persist or questions arise

## 2019-07-23 NOTE — ED PROVIDER NOTE - CPE EDP [facility] NORM
No. FLOR screening performed.  STOP BANG Legend: 0-2 = LOW Risk; 3-4 = INTERMEDIATE Risk; 5-8 = HIGH Risk
normal...

## 2021-06-15 PROBLEM — Z00.00 ENCOUNTER FOR PREVENTIVE HEALTH EXAMINATION: Status: ACTIVE | Noted: 2021-06-15

## 2021-06-25 ENCOUNTER — APPOINTMENT (OUTPATIENT)
Dept: COLORECTAL SURGERY | Facility: CLINIC | Age: 81
End: 2021-06-25
Payer: MEDICARE

## 2021-06-25 VITALS
DIASTOLIC BLOOD PRESSURE: 77 MMHG | TEMPERATURE: 98.1 F | HEIGHT: 69 IN | HEART RATE: 63 BPM | SYSTOLIC BLOOD PRESSURE: 123 MMHG | BODY MASS INDEX: 25.77 KG/M2 | WEIGHT: 174 LBS

## 2021-06-25 DIAGNOSIS — R15.9 FULL INCONTINENCE OF FECES: ICD-10-CM

## 2021-06-25 PROCEDURE — 99203 OFFICE O/P NEW LOW 30 MIN: CPT | Mod: 25

## 2021-06-25 PROCEDURE — 46600 DIAGNOSTIC ANOSCOPY SPX: CPT

## 2021-06-25 NOTE — ASSESSMENT
[FreeTextEntry1] : Advised increased efforts to help bulk stool and limit leakage/incontinence improved incomplete evacuation. He will trial a course of Imodium. If unsuccessful we'll advance to Lomotil.\par \par If symptoms persist despite medical therapy will refer for sacral nerve stimulation.

## 2021-06-25 NOTE — HISTORY OF PRESENT ILLNESS
[FreeTextEntry1] : 82 y/o M presents for evaluation of fecal incontinence, referred by Dr. Edmondson\par H/o HTN, fatty liver disease, anxiety and depression and A. Fib on Eliquis \par PSH of cataract surgery and hernia repair \par \par Reports issues with partial and full fecal incontinence for several years. Reports this typically occurs with walking and after eating.Able to detect fecal urgency when having full FI but not when experiencing FI. Fecal incontinence can occur several times in the course of two week to monthly\par Wearing diapers when leaving home\par States Dr. Edmondson told him he has incomplete evacuation and referred here for manometry\par Has completed Pelvic floor PT in the past without improvement in symptoms\par Denies abdominal or rectal pain, rectal bleeding or itching\par H/o hemorrhoids but not bothersome currently\par Denies h/o pelvic surgery or injury. Reports h/o low back strain but never evaluated by orthopedist or neurology\par BH:1-3 times daily. Stools can be formed to paste like. Also reports excess gas and "floating matter" in stool\par Denies constipation or straining. Admits on occasion, needing to strain\par Reports adequate dietary fiber intake. Currently drinking 48 oz. of water daily \par Taking Psyllium husk, 4 Tbsp daily. Taking 2 tabs Colace daily \par Previously prescribed Lomotil in the past that he feels has helped \par Last colonoscopy completed 1-2 years ago, prep was poor and one polyp removed per patient. Diverticulosis noted on previous colonoscopies \par FMH of Melanoma in sibling and lung cancer in father and sibling

## 2021-06-25 NOTE — PHYSICAL EXAM
[Excoriation] : no perianal excoriation [Lax] : was lax [Reflex Absent] : a normal anal reflex was present [None] : there was no rectal mass  [de-identified] : Prolapsing rectal mucosa easily reducible [FreeTextEntry1] : Medical assistant was present for the entire exam.\par \par Anoscopy was performed for evaluation of the patients rectal bleeding  history .\par The risks, benefits and alternatives were reviewed.\par \par A lighted anoscope was passed into the anal canal and the entire anal mucosal surface was inspected..  \par The findings revealed moderate internal hemorrhoids.\par  No masses or lesions were identified.\par \par

## 2021-12-01 ENCOUNTER — APPOINTMENT (OUTPATIENT)
Dept: GASTROENTEROLOGY | Facility: CLINIC | Age: 81
End: 2021-12-01
Payer: MEDICARE

## 2021-12-01 VITALS
WEIGHT: 174 LBS | HEART RATE: 66 BPM | TEMPERATURE: 97.6 F | SYSTOLIC BLOOD PRESSURE: 162 MMHG | DIASTOLIC BLOOD PRESSURE: 78 MMHG | BODY MASS INDEX: 25.77 KG/M2 | OXYGEN SATURATION: 98 % | HEIGHT: 69 IN

## 2021-12-01 DIAGNOSIS — Z86.69 PERSONAL HISTORY OF OTHER DISEASES OF THE NERVOUS SYSTEM AND SENSE ORGANS: ICD-10-CM

## 2021-12-01 DIAGNOSIS — Z86.79 PERSONAL HISTORY OF OTHER DISEASES OF THE CIRCULATORY SYSTEM: ICD-10-CM

## 2021-12-01 DIAGNOSIS — Z86.59 PERSONAL HISTORY OF OTHER MENTAL AND BEHAVIORAL DISORDERS: ICD-10-CM

## 2021-12-01 DIAGNOSIS — Z86.39 PERSONAL HISTORY OF OTHER ENDOCRINE, NUTRITIONAL AND METABOLIC DISEASE: ICD-10-CM

## 2021-12-01 PROCEDURE — 99213 OFFICE O/P EST LOW 20 MIN: CPT

## 2021-12-01 PROCEDURE — 99203 OFFICE O/P NEW LOW 30 MIN: CPT

## 2021-12-01 RX ORDER — MULTIVITAMIN
TABLET ORAL
Refills: 0 | Status: ACTIVE | COMMUNITY

## 2021-12-01 RX ORDER — METOPROLOL TARTRATE 75 MG/1
TABLET, FILM COATED ORAL
Refills: 0 | Status: ACTIVE | COMMUNITY

## 2021-12-01 RX ORDER — PNV NO.95/FERROUS FUM/FOLIC AC 28MG-0.8MG
TABLET ORAL
Refills: 0 | Status: ACTIVE | COMMUNITY

## 2021-12-01 RX ORDER — DULOXETINE HYDROCHLORIDE 40 MG/1
CAPSULE, DELAYED RELEASE PELLETS ORAL
Refills: 0 | Status: ACTIVE | COMMUNITY

## 2021-12-01 RX ORDER — APIXABAN 5 MG/1
TABLET, FILM COATED ORAL
Refills: 0 | Status: ACTIVE | COMMUNITY

## 2021-12-01 RX ORDER — PANTOPRAZOLE 40 MG/1
TABLET, DELAYED RELEASE ORAL
Refills: 0 | Status: ACTIVE | COMMUNITY

## 2021-12-01 RX ORDER — CHROMIUM 200 MCG
TABLET ORAL
Refills: 0 | Status: ACTIVE | COMMUNITY

## 2021-12-01 RX ORDER — ALLOPURINOL 200 MG/1
TABLET ORAL
Refills: 0 | Status: ACTIVE | COMMUNITY

## 2021-12-01 NOTE — HISTORY OF PRESENT ILLNESS
[FreeTextEntry1] : 80 yo male, pt of my prior practice, here for f/u of a pancreatic cyst.  No abdominal pain.  No N/V/D, no constipation.  No BRBPR, no dark stools.  No weight loss, no change in appetite.  Rare heartburn/reflux -- takes pantoprazole daily. \par \par Colonoscopy -- [facility] -- 9/2020 -- inadequate prep and one polyp - repeat in two years\par EGD-EUS -- 11/2019 for cyst on patient's pancreas\par \par No famhx of stomach, colon or pancreatic cancer.  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

## 2021-12-01 NOTE — ASSESSMENT
[FreeTextEntry1] : 82 yo male, pt of my prior practice, here for f/u of a pancreatic cyst\par \par - Will tentatively plan for an EUS to evaluate pancreatic cyst at [facility] pending record review\par - NPO after midnight\par - Pt will check with cardiology regarding holding Eliquis prior to procedure\par - D/w pt regarding need for COVID swab for the procedure as well as escort post-procedure\par - Risks of the procedure including bleeding, perforation, etc d/w the patient\par - Will obtain records from my prior practice\par \par

## 2021-12-22 ENCOUNTER — OUTPATIENT (OUTPATIENT)
Dept: OUTPATIENT SERVICES | Facility: HOSPITAL | Age: 81
LOS: 1 days | End: 2021-12-22

## 2021-12-22 ENCOUNTER — APPOINTMENT (OUTPATIENT)
Dept: MRI IMAGING | Facility: CLINIC | Age: 81
End: 2021-12-22
Payer: MEDICARE

## 2021-12-22 ENCOUNTER — RESULT REVIEW (OUTPATIENT)
Age: 81
End: 2021-12-22

## 2021-12-22 PROCEDURE — G1004: CPT

## 2021-12-22 PROCEDURE — 74183 MRI ABD W/O CNTR FLWD CNTR: CPT | Mod: 26,MG

## 2022-01-07 ENCOUNTER — TRANSCRIPTION ENCOUNTER (OUTPATIENT)
Age: 82
End: 2022-01-07

## 2023-12-13 ENCOUNTER — APPOINTMENT (OUTPATIENT)
Dept: GASTROENTEROLOGY | Facility: CLINIC | Age: 83
End: 2023-12-13
Payer: MEDICARE

## 2023-12-13 VITALS — DIASTOLIC BLOOD PRESSURE: 88 MMHG | OXYGEN SATURATION: 98 % | HEART RATE: 65 BPM | SYSTOLIC BLOOD PRESSURE: 139 MMHG

## 2023-12-13 DIAGNOSIS — K86.2 CYST OF PANCREAS: ICD-10-CM

## 2023-12-13 PROCEDURE — 99214 OFFICE O/P EST MOD 30 MIN: CPT

## 2023-12-13 RX ORDER — ROSUVASTATIN CALCIUM 5 MG/1
5 TABLET, FILM COATED ORAL
Refills: 0 | Status: ACTIVE | COMMUNITY

## 2023-12-13 RX ORDER — OLMESARTAN MEDOXOMIL 20 MG/1
20 TABLET, FILM COATED ORAL
Refills: 0 | Status: ACTIVE | COMMUNITY

## 2023-12-13 RX ORDER — IRON/IRON ASP GLY/FA/MV-MIN 38 125-25-1MG
TABLET ORAL
Refills: 0 | Status: DISCONTINUED | COMMUNITY
End: 2023-12-13

## 2023-12-13 RX ORDER — DOCUSATE SODIUM 100 MG/1
CAPSULE ORAL
Refills: 0 | Status: DISCONTINUED | COMMUNITY
End: 2023-12-13

## 2023-12-13 RX ORDER — DIPHENOXYLATE HYDROCHLORIDE AND ATROPINE SULFATE 2.5; .025 MG/1; MG/1
TABLET ORAL
Refills: 0 | Status: ACTIVE | COMMUNITY

## 2023-12-13 NOTE — ASSESSMENT
[FreeTextEntry1] : 82 yo male here for f/u of a pancreatic cysts, measuring up to 7 mm.  - Will repeat an MRI/MRCP to assess cysts for growth - F/u in office in two years

## 2023-12-13 NOTE — HISTORY OF PRESENT ILLNESS
[de-identified] : EGD-EUS -- 11/2019  [FreeTextEntry1] : 12/22/2021 Fatty replacement of the pancreas, few cystic lesions perdominantly in body up to 0.7 cm, no PD dilation

## 2024-01-04 ENCOUNTER — OUTPATIENT (OUTPATIENT)
Dept: OUTPATIENT SERVICES | Facility: HOSPITAL | Age: 84
LOS: 1 days | End: 2024-01-04

## 2024-01-04 ENCOUNTER — APPOINTMENT (OUTPATIENT)
Dept: MRI IMAGING | Facility: CLINIC | Age: 84
End: 2024-01-04
Payer: MEDICARE

## 2024-01-04 PROCEDURE — 74183 MRI ABD W/O CNTR FLWD CNTR: CPT | Mod: 26

## 2024-01-08 ENCOUNTER — NON-APPOINTMENT (OUTPATIENT)
Age: 84
End: 2024-01-08

## 2024-01-12 ENCOUNTER — TRANSCRIPTION ENCOUNTER (OUTPATIENT)
Age: 84
End: 2024-01-12

## 2024-12-19 ENCOUNTER — OUTPATIENT (OUTPATIENT)
Dept: OUTPATIENT SERVICES | Facility: HOSPITAL | Age: 84
LOS: 1 days | End: 2024-12-19

## 2024-12-19 ENCOUNTER — APPOINTMENT (OUTPATIENT)
Dept: MRI IMAGING | Facility: CLINIC | Age: 84
End: 2024-12-19
Payer: MEDICARE

## 2024-12-19 PROCEDURE — 74183 MRI ABD W/O CNTR FLWD CNTR: CPT | Mod: 26

## 2024-12-20 ENCOUNTER — NON-APPOINTMENT (OUTPATIENT)
Age: 84
End: 2024-12-20